# Patient Record
Sex: MALE | Race: WHITE | NOT HISPANIC OR LATINO | Employment: UNEMPLOYED | URBAN - NONMETROPOLITAN AREA
[De-identification: names, ages, dates, MRNs, and addresses within clinical notes are randomized per-mention and may not be internally consistent; named-entity substitution may affect disease eponyms.]

---

## 2017-02-07 ENCOUNTER — HOSPITAL ENCOUNTER (EMERGENCY)
Facility: HOSPITAL | Age: 42
Discharge: HOME | End: 2017-02-07
Attending: EMERGENCY MEDICINE | Admitting: EMERGENCY MEDICINE

## 2017-02-07 ENCOUNTER — APPOINTMENT (OUTPATIENT)
Dept: GENERAL RADIOLOGY | Facility: HOSPITAL | Age: 42
End: 2017-02-07

## 2017-02-07 VITALS
WEIGHT: 165 LBS | HEIGHT: 66 IN | HEART RATE: 61 BPM | RESPIRATION RATE: 18 BRPM | OXYGEN SATURATION: 100 % | SYSTOLIC BLOOD PRESSURE: 112 MMHG | DIASTOLIC BLOOD PRESSURE: 68 MMHG | BODY MASS INDEX: 26.52 KG/M2 | TEMPERATURE: 98.7 F

## 2017-02-07 DIAGNOSIS — J20.9 ACUTE BRONCHITIS, UNSPECIFIED ORGANISM: Primary | ICD-10-CM

## 2017-02-07 LAB
ALBUMIN SERPL-MCNC: 4.1 G/DL (ref 3.5–5)
ALBUMIN/GLOB SERPL: 1.3 G/DL (ref 1–2)
ALP SERPL-CCNC: 75 U/L (ref 38–126)
ALT SERPL W P-5'-P-CCNC: 26 U/L (ref 13–69)
ANION GAP SERPL CALCULATED.3IONS-SCNC: 13.9 MMOL/L
AST SERPL-CCNC: 18 U/L (ref 15–46)
BASOPHILS # BLD AUTO: 0.07 10*3/MM3 (ref 0–0.2)
BASOPHILS NFR BLD AUTO: 0.8 % (ref 0–2.5)
BILIRUB SERPL-MCNC: 0.2 MG/DL (ref 0.2–1.3)
BUN BLD-MCNC: 8 MG/DL (ref 7–20)
BUN/CREAT SERPL: 13.3 (ref 6.3–21.9)
CALCIUM SPEC-SCNC: 8.9 MG/DL (ref 8.4–10.2)
CHLORIDE SERPL-SCNC: 107 MMOL/L (ref 98–107)
CO2 SERPL-SCNC: 29 MMOL/L (ref 26–30)
CREAT BLD-MCNC: 0.6 MG/DL (ref 0.6–1.3)
DEPRECATED RDW RBC AUTO: 45 FL (ref 37–54)
EOSINOPHIL # BLD AUTO: 0.24 10*3/MM3 (ref 0–0.7)
EOSINOPHIL NFR BLD AUTO: 2.6 % (ref 0–7)
ERYTHROCYTE [DISTWIDTH] IN BLOOD BY AUTOMATED COUNT: 14 % (ref 11.5–14.5)
GFR SERPL CREATININE-BSD FRML MDRD: 148 ML/MIN/1.73
GLOBULIN UR ELPH-MCNC: 3.2 GM/DL
GLUCOSE BLD-MCNC: 88 MG/DL (ref 74–98)
HCT VFR BLD AUTO: 38.4 % (ref 42–52)
HGB BLD-MCNC: 12.6 G/DL (ref 14–18)
HOLD SPECIMEN: NORMAL
HOLD SPECIMEN: NORMAL
IMM GRANULOCYTES # BLD: 0.06 10*3/MM3 (ref 0–0.06)
IMM GRANULOCYTES NFR BLD: 0.6 % (ref 0–0.6)
LYMPHOCYTES # BLD AUTO: 2.58 10*3/MM3 (ref 0.6–3.4)
LYMPHOCYTES NFR BLD AUTO: 27.8 % (ref 10–50)
MCH RBC QN AUTO: 29 PG (ref 27–31)
MCHC RBC AUTO-ENTMCNC: 32.8 G/DL (ref 30–37)
MCV RBC AUTO: 88.5 FL (ref 80–94)
MONOCYTES # BLD AUTO: 0.82 10*3/MM3 (ref 0–0.9)
MONOCYTES NFR BLD AUTO: 8.8 % (ref 0–12)
NEUTROPHILS # BLD AUTO: 5.51 10*3/MM3 (ref 2–6.9)
NEUTROPHILS NFR BLD AUTO: 59.4 % (ref 37–80)
NRBC BLD MANUAL-RTO: 0 /100 WBC (ref 0–0)
PLATELET # BLD AUTO: 288 10*3/MM3 (ref 130–400)
PMV BLD AUTO: 9.8 FL (ref 6–12)
POTASSIUM BLD-SCNC: 3.9 MMOL/L (ref 3.5–5.1)
PROT SERPL-MCNC: 7.3 G/DL (ref 6.3–8.2)
RBC # BLD AUTO: 4.34 10*6/MM3 (ref 4.7–6.1)
SODIUM BLD-SCNC: 146 MMOL/L (ref 137–145)
TROPONIN I SERPL-MCNC: 0 NG/ML (ref 0–0.05)
TROPONIN I SERPL-MCNC: <0.012 NG/ML (ref 0–0.03)
WBC NRBC COR # BLD: 9.28 10*3/MM3 (ref 4.8–10.8)
WHOLE BLOOD HOLD SPECIMEN: NORMAL
WHOLE BLOOD HOLD SPECIMEN: NORMAL

## 2017-02-07 PROCEDURE — 99284 EMERGENCY DEPT VISIT MOD MDM: CPT

## 2017-02-07 PROCEDURE — 71010 HC CHEST PA OR AP: CPT

## 2017-02-07 PROCEDURE — 93005 ELECTROCARDIOGRAM TRACING: CPT

## 2017-02-07 PROCEDURE — 84484 ASSAY OF TROPONIN QUANT: CPT | Performed by: EMERGENCY MEDICINE

## 2017-02-07 PROCEDURE — 80053 COMPREHEN METABOLIC PANEL: CPT | Performed by: EMERGENCY MEDICINE

## 2017-02-07 PROCEDURE — 85025 COMPLETE CBC W/AUTO DIFF WBC: CPT | Performed by: EMERGENCY MEDICINE

## 2017-02-07 RX ORDER — PREDNISONE 20 MG/1
60 TABLET ORAL DAILY
Qty: 15 TABLET | Refills: 0 | Status: SHIPPED | OUTPATIENT
Start: 2017-02-07 | End: 2018-08-13

## 2017-02-07 RX ORDER — ALBUTEROL SULFATE 90 UG/1
2 AEROSOL, METERED RESPIRATORY (INHALATION) EVERY 4 HOURS PRN
Qty: 1 INHALER | Refills: 0 | Status: SHIPPED | OUTPATIENT
Start: 2017-02-07 | End: 2018-08-13

## 2017-02-07 RX ORDER — ASPIRIN 325 MG
325 TABLET ORAL ONCE
Status: COMPLETED | OUTPATIENT
Start: 2017-02-07 | End: 2017-02-07

## 2017-02-07 RX ORDER — SODIUM CHLORIDE 0.9 % (FLUSH) 0.9 %
10 SYRINGE (ML) INJECTION AS NEEDED
Status: DISCONTINUED | OUTPATIENT
Start: 2017-02-07 | End: 2017-02-07 | Stop reason: HOSPADM

## 2017-02-07 RX ORDER — AZITHROMYCIN 250 MG/1
TABLET, FILM COATED ORAL
Qty: 6 TABLET | Refills: 0 | Status: SHIPPED | OUTPATIENT
Start: 2017-02-07 | End: 2018-08-13

## 2017-02-07 RX ADMIN — ASPIRIN 325 MG ORAL TABLET 325 MG: 325 PILL ORAL at 14:18

## 2017-02-07 RX ADMIN — Medication 10 ML: at 14:19

## 2017-02-07 NOTE — ED PROVIDER NOTES
Subjective   HPI Comments: Patient is a 42-year-old male who presents emergency department complaining of several day history of cough with yellow/green sputum and sometimes tussive emesis.  He has subjective fevers and chills.  His chest hurts from coughing.  He is a smoker and is trying to quit.      History provided by:  Patient      Review of Systems   Constitutional: Positive for chills and fever.   HENT: Positive for congestion.    Respiratory: Positive for cough and shortness of breath.    Cardiovascular: Positive for chest pain.   Gastrointestinal: Negative for abdominal pain and diarrhea.   All other systems reviewed and are negative.      History reviewed. No pertinent past medical history.    Allergies   Allergen Reactions   • Penicillins        Past Surgical History   Procedure Laterality Date   • Stomach surgery       for ulcers       History reviewed. No pertinent family history.    Social History     Social History   • Marital status:      Spouse name: N/A   • Number of children: N/A   • Years of education: N/A     Social History Main Topics   • Smoking status: Current Every Day Smoker     Packs/day: 0.50   • Smokeless tobacco: None   • Alcohol use No   • Drug use: None   • Sexual activity: Not Asked     Other Topics Concern   • None     Social History Narrative   • None           Objective   Physical Exam   Constitutional: He is oriented to person, place, and time. He appears well-developed and well-nourished. No distress.   HENT:   Head: Normocephalic.   Eyes: Pupils are equal, round, and reactive to light.   Neck: Neck supple.   Cardiovascular: Normal rate, regular rhythm and normal heart sounds.    Pulmonary/Chest: No respiratory distress.   Scattered rhonchi and some small wheezes   Abdominal: Soft. There is no tenderness.   Musculoskeletal: Normal range of motion. He exhibits no edema.   Neurological: He is alert and oriented to person, place, and time.   Skin: Skin is warm and dry. He  is not diaphoretic.   Psychiatric: He has a normal mood and affect. His behavior is normal.   Nursing note and vitals reviewed.      Procedures         ED Course  ED Course                  MDM  Number of Diagnoses or Management Options  Acute bronchitis, unspecified organism:   Diagnosis management comments: CXR per rad: No active disease by portable imaging.       Amount and/or Complexity of Data Reviewed  Clinical lab tests: ordered and reviewed  Tests in the radiology section of CPT®: ordered and reviewed  Tests in the medicine section of CPT®: reviewed and ordered        Final diagnoses:   Acute bronchitis, unspecified organism            Lamont Flor MD  02/07/17 2778

## 2017-09-06 ENCOUNTER — HOSPITAL ENCOUNTER (EMERGENCY)
Facility: HOSPITAL | Age: 42
Discharge: HOME | End: 2017-09-06
Attending: EMERGENCY MEDICINE | Admitting: EMERGENCY MEDICINE

## 2017-09-06 ENCOUNTER — APPOINTMENT (OUTPATIENT)
Dept: CT IMAGING | Facility: HOSPITAL | Age: 42
End: 2017-09-06

## 2017-09-06 VITALS
HEIGHT: 66 IN | WEIGHT: 160 LBS | OXYGEN SATURATION: 98 % | BODY MASS INDEX: 25.71 KG/M2 | RESPIRATION RATE: 20 BRPM | DIASTOLIC BLOOD PRESSURE: 79 MMHG | SYSTOLIC BLOOD PRESSURE: 132 MMHG | HEART RATE: 62 BPM | TEMPERATURE: 98 F

## 2017-09-06 DIAGNOSIS — R51.9 NONINTRACTABLE HEADACHE, UNSPECIFIED CHRONICITY PATTERN, UNSPECIFIED HEADACHE TYPE: Primary | ICD-10-CM

## 2017-09-06 PROCEDURE — 25010000002 PROCHLORPERAZINE EDISYLATE PER 10 MG: Performed by: EMERGENCY MEDICINE

## 2017-09-06 PROCEDURE — 99283 EMERGENCY DEPT VISIT LOW MDM: CPT

## 2017-09-06 PROCEDURE — 25010000002 KETOROLAC TROMETHAMINE PER 15 MG: Performed by: EMERGENCY MEDICINE

## 2017-09-06 PROCEDURE — 96375 TX/PRO/DX INJ NEW DRUG ADDON: CPT

## 2017-09-06 PROCEDURE — 96374 THER/PROPH/DIAG INJ IV PUSH: CPT

## 2017-09-06 PROCEDURE — 96361 HYDRATE IV INFUSION ADD-ON: CPT

## 2017-09-06 PROCEDURE — 70450 CT HEAD/BRAIN W/O DYE: CPT

## 2017-09-06 PROCEDURE — 25010000002 DIPHENHYDRAMINE PER 50 MG: Performed by: EMERGENCY MEDICINE

## 2017-09-06 RX ORDER — BUTALBITAL, ACETAMINOPHEN AND CAFFEINE 50; 325; 40 MG/1; MG/1; MG/1
1 TABLET ORAL EVERY 6 HOURS PRN
Qty: 16 TABLET | Refills: 0 | Status: SHIPPED | OUTPATIENT
Start: 2017-09-06 | End: 2018-08-13

## 2017-09-06 RX ORDER — DIPHENHYDRAMINE HYDROCHLORIDE 50 MG/ML
25 INJECTION INTRAMUSCULAR; INTRAVENOUS ONCE
Status: COMPLETED | OUTPATIENT
Start: 2017-09-06 | End: 2017-09-06

## 2017-09-06 RX ORDER — GABAPENTIN 300 MG/1
300 CAPSULE ORAL 2 TIMES DAILY
COMMUNITY

## 2017-09-06 RX ORDER — KETOROLAC TROMETHAMINE 30 MG/ML
15 INJECTION, SOLUTION INTRAMUSCULAR; INTRAVENOUS ONCE
Status: COMPLETED | OUTPATIENT
Start: 2017-09-06 | End: 2017-09-06

## 2017-09-06 RX ADMIN — KETOROLAC TROMETHAMINE 15 MG: 30 INJECTION, SOLUTION INTRAMUSCULAR at 14:32

## 2017-09-06 RX ADMIN — PROCHLORPERAZINE EDISYLATE 10 MG: 5 INJECTION INTRAMUSCULAR; INTRAVENOUS at 14:32

## 2017-09-06 RX ADMIN — DIPHENHYDRAMINE HYDROCHLORIDE 25 MG: 50 INJECTION, SOLUTION INTRAMUSCULAR; INTRAVENOUS at 14:31

## 2017-09-06 RX ADMIN — SODIUM CHLORIDE 1000 ML: 9 INJECTION, SOLUTION INTRAVENOUS at 14:31
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
## 2017-11-14 ENCOUNTER — HOSPITAL ENCOUNTER (EMERGENCY)
Facility: HOSPITAL | Age: 42
Discharge: HOME | End: 2017-11-14
Attending: EMERGENCY MEDICINE | Admitting: EMERGENCY MEDICINE

## 2017-11-14 VITALS
HEART RATE: 65 BPM | SYSTOLIC BLOOD PRESSURE: 131 MMHG | RESPIRATION RATE: 18 BRPM | OXYGEN SATURATION: 98 % | HEIGHT: 66 IN | BODY MASS INDEX: 25.71 KG/M2 | DIASTOLIC BLOOD PRESSURE: 89 MMHG | TEMPERATURE: 98.3 F | WEIGHT: 160 LBS

## 2017-11-14 DIAGNOSIS — H66.90 ACUTE OTITIS MEDIA, UNSPECIFIED OTITIS MEDIA TYPE: Primary | ICD-10-CM

## 2017-11-14 DIAGNOSIS — J40 BRONCHITIS: ICD-10-CM

## 2017-11-14 LAB — S PYO AG THROAT QL: NEGATIVE

## 2017-11-14 PROCEDURE — 87081 CULTURE SCREEN ONLY: CPT | Performed by: EMERGENCY MEDICINE

## 2017-11-14 PROCEDURE — 99283 EMERGENCY DEPT VISIT LOW MDM: CPT

## 2017-11-14 PROCEDURE — 87880 STREP A ASSAY W/OPTIC: CPT | Performed by: EMERGENCY MEDICINE

## 2017-11-14 RX ORDER — BENZONATATE 100 MG/1
100 CAPSULE ORAL 3 TIMES DAILY PRN
Qty: 15 CAPSULE | Refills: 0 | Status: SHIPPED | OUTPATIENT
Start: 2017-11-14 | End: 2018-08-13

## 2017-11-14 RX ORDER — AZITHROMYCIN 250 MG/1
TABLET, FILM COATED ORAL
Qty: 6 TABLET | Refills: 0 | Status: SHIPPED | OUTPATIENT
Start: 2017-11-14 | End: 2018-08-13

## 2017-11-14 NOTE — ED PROVIDER NOTES
Subjective   HPI Comments: 42-year-old male complaining of second week  of cough productive of yellowish phlegm, bilateral earache and sore throat, chills and sweats.  He denies any chest pain or difficulty breathing.  He is a smoker.  Denies history of pneumonia.  Is not taking any medication.  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      History provided by:  Patient  History limited by: no limits.   used: No        Review of Systems   Constitutional: Positive for chills and diaphoresis. Negative for activity change, appetite change, fatigue and fever.   HENT: Positive for ear pain and sore throat. Negative for congestion, rhinorrhea and sneezing.    Eyes: Negative for pain, discharge and redness.   Respiratory: Positive for cough. Negative for shortness of breath and wheezing.    Cardiovascular: Negative.  Negative for chest pain.   Gastrointestinal: Negative for abdominal pain, constipation, diarrhea, nausea and vomiting.   Endocrine: Negative.    Genitourinary: Negative for difficulty urinating, dysuria and frequency.   Musculoskeletal: Negative for back pain and neck pain.   Skin: Negative for color change, pallor and rash.   Allergic/Immunologic: Negative.    Neurological: Negative.    Hematological: Negative.    Psychiatric/Behavioral: Negative.    All other systems reviewed and are negative.      Past Medical History:   Diagnosis Date   • Head trauma    • Headache        Allergies   Allergen Reactions   • Penicillins        Past Surgical History:   Procedure Laterality Date   • ANKLE SURGERY     • HAND SURGERY     • STOMACH SURGERY      for ulcers       History reviewed. No pertinent family history.    Social History     Social History   • Marital status:      Spouse name: N/A   • Number of children: N/A   • Years of education: N/A     Social History Main Topics   • Smoking status: Current Every  Day Smoker     Packs/day: 0.50   • Smokeless tobacco: Never Used   • Alcohol use No   • Drug use: No   • Sexual activity: Defer     Other Topics Concern   • None     Social History Narrative   • None           Objective   Physical Exam   Constitutional: He is oriented to person, place, and time. He appears well-developed and well-nourished. No distress.   HENT:   Head: Normocephalic and atraumatic.   Right Ear: External ear normal.   Left Ear: External ear normal.   Nose: Nose normal.   Mouth/Throat: Oropharynx is clear and moist. No oropharyngeal exudate.   TMs dull and retracted bilaterally.   Eyes: Conjunctivae and EOM are normal. Pupils are equal, round, and reactive to light. Right eye exhibits no discharge. Left eye exhibits no discharge. No scleral icterus.   Neck: Normal range of motion. Neck supple. No JVD present. No tracheal deviation present.   Cardiovascular: Normal rate, regular rhythm and normal heart sounds.    Pulmonary/Chest: Effort normal and breath sounds normal. No stridor. No respiratory distress. He has no wheezes. He has no rales.   Abdominal: Soft. Bowel sounds are normal. He exhibits no mass. There is no tenderness. There is no rebound and no guarding. No hernia.   Musculoskeletal: Normal range of motion. He exhibits no edema, tenderness or deformity.   Lymphadenopathy:     He has no cervical adenopathy.   Neurological: He is alert and oriented to person, place, and time. No cranial nerve deficit. He exhibits normal muscle tone. Coordination normal.   Skin: Skin is warm and dry. No rash noted. He is not diaphoretic. No erythema. No pallor.   Psychiatric: He has a normal mood and affect. His behavior is normal. Judgment and thought content normal.   Nursing note and vitals reviewed.      Procedures         ED Course  ED Course   Comment By Time   Pt counseled to wear dusk mask as work to minimize inhaling dust particles.  Encouraged to quit smoking.  To take all antibiotic as directed.   Recheck with PCP in 2-3 days if not improved.  QUESTIONS answered to patient's satisfaction, he is agreeable to the treatment plan and discharge home. Debbi Flores PA-C 11/14 [id number]                  [facility]    Final diagnoses:   Acute otitis media, unspecified otitis media type   Bronchitis            Debbi Flores PA-C  11/14/17 4639

## 2017-11-14 NOTE — DISCHARGE INSTRUCTIONS
Take all of your antibiotic as directed.  Alternate Tylenol and Motrin every 4-6 hours as needed for discomfort or fever.  Please stop smoking.  Wear your dust mask when at work.  Turn to the ER for any chest pain, difficulty breathing, uncontrollable fever or any other worrisome changes.

## 2017-11-16 LAB — BACTERIA SPEC AEROBE CULT: NORMAL

## 2018-01-26 ENCOUNTER — HOSPITAL ENCOUNTER (EMERGENCY)
Facility: HOSPITAL | Age: 43
Discharge: HOME | End: 2018-01-26
Attending: EMERGENCY MEDICINE | Admitting: EMERGENCY MEDICINE

## 2018-01-26 ENCOUNTER — APPOINTMENT (OUTPATIENT)
Dept: GENERAL RADIOLOGY | Facility: HOSPITAL | Age: 43
End: 2018-01-26

## 2018-01-26 VITALS
TEMPERATURE: 97.7 F | HEART RATE: 95 BPM | OXYGEN SATURATION: 98 % | WEIGHT: 160 LBS | RESPIRATION RATE: 16 BRPM | BODY MASS INDEX: 25.71 KG/M2 | DIASTOLIC BLOOD PRESSURE: 81 MMHG | SYSTOLIC BLOOD PRESSURE: 135 MMHG | HEIGHT: 66 IN

## 2018-01-26 DIAGNOSIS — S46.911A STRAIN OF RIGHT SHOULDER, INITIAL ENCOUNTER: Primary | ICD-10-CM

## 2018-01-26 PROCEDURE — 99282 EMERGENCY DEPT VISIT SF MDM: CPT

## 2018-01-26 PROCEDURE — 73030 X-RAY EXAM OF SHOULDER: CPT

## 2018-01-26 NOTE — ED PROVIDER NOTES
Subjective   Patient is a 43 y.o. male presenting with shoulder problem.   History provided by:  Patient   used: No    Shoulder Problem   Location:  Shoulder  Shoulder location:  R shoulder  Injury: yes    Time since incident:  2 days  Mechanism of injury: fall    Fall:     Fall occurred:  Standing    Impact surface:  Ice    Point of impact: right shoulder.    Entrapped after fall: no    Pain details:     Quality:  Aching    Radiates to:  R forearm    Severity:  Mild    Onset quality:  Gradual    Duration:  2 days    Timing:  Constant    Progression:  Waxing and waning  Handedness:  Right-handed  Dislocation: no    Foreign body present:  No foreign bodies  Tetanus status:  Unknown  Prior injury to area:  No  Relieved by:  Immobilization  Worsened by:  Movement  Ineffective treatments:  Rest  Associated symptoms: stiffness        Review of Systems   Musculoskeletal: Positive for stiffness.        Right shoulder injury   All other systems reviewed and are negative.      Past Medical History:   Diagnosis Date   • Head trauma    • Headache        Allergies   Allergen Reactions   • Penicillins        Past Surgical History:   Procedure Laterality Date   • ANKLE SURGERY     • HAND SURGERY     • STOMACH SURGERY      for ulcers       History reviewed. No pertinent family history.    Social History     Social History   • Marital status:      Spouse name: N/A   • Number of children: N/A   • Years of education: N/A     Social History Main Topics   • Smoking status: Current Every Day Smoker     Packs/day: 0.50   • Smokeless tobacco: Never Used   • Alcohol use No   • Drug use: No   • Sexual activity: Defer     Other Topics Concern   • None     Social History Narrative           Objective   Physical Exam   Constitutional: He is oriented to person, place, and time. He appears well-developed and well-nourished.   HENT:   Head: Normocephalic and atraumatic.   Left Ear: External ear normal.   Eyes: EOM are  normal. Pupils are equal, round, and reactive to light.   Neck: Normal range of motion.   Cardiovascular: Normal rate and regular rhythm.    Pulmonary/Chest: Effort normal and breath sounds normal.   Abdominal: Soft. Bowel sounds are normal.   Musculoskeletal: Normal range of motion. He exhibits tenderness. He exhibits no edema or deformity.   No acute abnormalities on exam of right shoulder, normal range of motion, neurovascularly intact.  Equal strength bilaterally   Neurological: He is alert and oriented to person, place, and time.   Skin: Skin is warm and dry.   Psychiatric: He has a normal mood and affect. His behavior is normal. Judgment and thought content normal.   Nursing note and vitals reviewed.      Procedures         ED Course  ED Course                  MDM    Final diagnoses:   Strain of right shoulder, initial encounter            Abdelrahman Puckett Jr., PA-C  01/26/18 4132

## 2018-02-13 ENCOUNTER — HOSPITAL ENCOUNTER (EMERGENCY)
Facility: HOSPITAL | Age: 43
Discharge: HOME | End: 2018-02-13
Attending: STUDENT IN AN ORGANIZED HEALTH CARE EDUCATION/TRAINING PROGRAM | Admitting: STUDENT IN AN ORGANIZED HEALTH CARE EDUCATION/TRAINING PROGRAM

## 2018-02-13 VITALS
RESPIRATION RATE: 18 BRPM | OXYGEN SATURATION: 99 % | TEMPERATURE: 98.8 F | SYSTOLIC BLOOD PRESSURE: 147 MMHG | DIASTOLIC BLOOD PRESSURE: 89 MMHG | HEIGHT: 66 IN | BODY MASS INDEX: 25.71 KG/M2 | WEIGHT: 160 LBS | HEART RATE: 97 BPM

## 2018-02-13 DIAGNOSIS — H66.90 ACUTE OTITIS MEDIA, UNSPECIFIED OTITIS MEDIA TYPE: Primary | ICD-10-CM

## 2018-02-13 LAB
FLUAV AG NPH QL: NEGATIVE
FLUBV AG NPH QL IA: NEGATIVE
S PYO AG THROAT QL: NEGATIVE

## 2018-02-13 PROCEDURE — 87081 CULTURE SCREEN ONLY: CPT | Performed by: STUDENT IN AN ORGANIZED HEALTH CARE EDUCATION/TRAINING PROGRAM

## 2018-02-13 PROCEDURE — 87880 STREP A ASSAY W/OPTIC: CPT | Performed by: STUDENT IN AN ORGANIZED HEALTH CARE EDUCATION/TRAINING PROGRAM

## 2018-02-13 PROCEDURE — 99283 EMERGENCY DEPT VISIT LOW MDM: CPT

## 2018-02-13 PROCEDURE — 87804 INFLUENZA ASSAY W/OPTIC: CPT | Performed by: STUDENT IN AN ORGANIZED HEALTH CARE EDUCATION/TRAINING PROGRAM

## 2018-02-13 RX ORDER — AZITHROMYCIN 250 MG/1
TABLET, FILM COATED ORAL
Qty: 6 TABLET | Refills: 0 | Status: SHIPPED | OUTPATIENT
Start: 2018-02-13 | End: 2018-08-13

## 2018-02-14 NOTE — ED PROVIDER NOTES
Subjective   History of Present Illness  Patient presents with complaints of bilateral ear pain.  This is been going on for several days.  He's had a sore throat since yesterday.  He's had fatigue.  He believes she's had fevers and chills.  No drainage from the ears.  No nausea vomiting.  No chest pain or shortness of breath.  Review of Systems   All other systems reviewed and are negative.      Past Medical History:   Diagnosis Date   • Head trauma    • Headache        Allergies   Allergen Reactions   • Penicillins        Past Surgical History:   Procedure Laterality Date   • ANKLE SURGERY     • HAND SURGERY     • STOMACH SURGERY      for ulcers       History reviewed. No pertinent family history.    Social History     Social History   • Marital status:      Spouse name: N/A   • Number of children: N/A   • Years of education: N/A     Social History Main Topics   • Smoking status: Current Every Day Smoker     Packs/day: 0.50   • Smokeless tobacco: Never Used   • Alcohol use No   • Drug use: No   • Sexual activity: Defer     Other Topics Concern   • None     Social History Narrative           Objective   Physical Exam   Constitutional: He is oriented to person, place, and time. He appears well-developed and well-nourished.   HENT:   Head: Normocephalic.   Mouth/Throat: Oropharynx is clear and moist.   TMs are retracted with fluid behind them bilaterally and they are both red.   Eyes: Conjunctivae are normal. Pupils are equal, round, and reactive to light.   Neck: Normal range of motion.   Cardiovascular: Normal rate, regular rhythm and normal heart sounds.    Pulmonary/Chest: Effort normal and breath sounds normal.   Musculoskeletal: Normal range of motion.   Neurological: He is alert and oriented to person, place, and time.   Skin: Skin is warm and dry.   Psychiatric: He has a normal mood and affect. His behavior is normal. Judgment and thought content normal.   Nursing note and vitals  reviewed.      Procedures         ED Course  ED Course      Will 8 with a Z-Ceferino..  He will follow up with his primary care provider for recheck.            MDM    Final diagnoses:   Acute otitis media, unspecified otitis media type            Marianela Solano, APRN  02/14/18 0035

## 2018-02-14 NOTE — DISCHARGE INSTRUCTIONS

## 2018-02-15 LAB — BACTERIA SPEC AEROBE CULT: NORMAL

## 2018-02-26 ENCOUNTER — APPOINTMENT (OUTPATIENT)
Dept: GENERAL RADIOLOGY | Facility: HOSPITAL | Age: 43
End: 2018-02-26

## 2018-02-26 ENCOUNTER — HOSPITAL ENCOUNTER (EMERGENCY)
Facility: HOSPITAL | Age: 43
Discharge: HOME | End: 2018-02-27
Attending: EMERGENCY MEDICINE | Admitting: EMERGENCY MEDICINE

## 2018-02-26 DIAGNOSIS — M54.50 LOW BACK PAIN WITHOUT SCIATICA, UNSPECIFIED BACK PAIN LATERALITY, UNSPECIFIED CHRONICITY: Primary | ICD-10-CM

## 2018-02-26 PROCEDURE — 96372 THER/PROPH/DIAG INJ SC/IM: CPT

## 2018-02-26 PROCEDURE — 25010000002 KETOROLAC TROMETHAMINE PER 15 MG: Performed by: EMERGENCY MEDICINE

## 2018-02-26 PROCEDURE — 72100 X-RAY EXAM L-S SPINE 2/3 VWS: CPT

## 2018-02-26 PROCEDURE — 99283 EMERGENCY DEPT VISIT LOW MDM: CPT

## 2018-02-26 PROCEDURE — 25010000002 ORPHENADRINE CITRATE PER 60 MG: Performed by: EMERGENCY MEDICINE

## 2018-02-26 PROCEDURE — 25010000002 MORPHINE PER 10 MG: Performed by: EMERGENCY MEDICINE

## 2018-02-26 RX ORDER — KETOROLAC TROMETHAMINE 30 MG/ML
30 INJECTION, SOLUTION INTRAMUSCULAR; INTRAVENOUS ONCE
Status: COMPLETED | OUTPATIENT
Start: 2018-02-26 | End: 2018-02-26

## 2018-02-26 RX ORDER — MORPHINE SULFATE 4 MG/ML
4 INJECTION, SOLUTION INTRAMUSCULAR; INTRAVENOUS ONCE
Status: COMPLETED | OUTPATIENT
Start: 2018-02-26 | End: 2018-02-26

## 2018-02-26 RX ORDER — ORPHENADRINE CITRATE 30 MG/ML
60 INJECTION INTRAMUSCULAR; INTRAVENOUS ONCE
Status: COMPLETED | OUTPATIENT
Start: 2018-02-26 | End: 2018-02-26

## 2018-02-26 RX ADMIN — MORPHINE SULFATE 4 MG: 4 INJECTION, SOLUTION INTRAMUSCULAR; INTRAVENOUS at 23:47

## 2018-02-26 RX ADMIN — KETOROLAC TROMETHAMINE 30 MG: 30 INJECTION, SOLUTION INTRAMUSCULAR at 23:47

## 2018-02-26 RX ADMIN — ORPHENADRINE CITRATE 60 MG: 30 INJECTION INTRAMUSCULAR; INTRAVENOUS at 23:45

## 2018-02-27 VITALS
OXYGEN SATURATION: 96 % | RESPIRATION RATE: 18 BRPM | DIASTOLIC BLOOD PRESSURE: 67 MMHG | SYSTOLIC BLOOD PRESSURE: 110 MMHG | BODY MASS INDEX: 25.71 KG/M2 | WEIGHT: 160 LBS | TEMPERATURE: 98 F | HEIGHT: 66 IN | HEART RATE: 87 BPM

## 2018-02-27 RX ORDER — CYCLOBENZAPRINE HCL 10 MG
10 TABLET ORAL 3 TIMES DAILY PRN
Qty: 15 TABLET | Refills: 0 | Status: SHIPPED | OUTPATIENT
Start: 2018-02-27 | End: 2018-08-13

## 2018-02-27 RX ORDER — NAPROXEN 500 MG/1
500 TABLET ORAL 2 TIMES DAILY PRN
Qty: 14 TABLET | Refills: 0 | Status: SHIPPED | OUTPATIENT
Start: 2018-02-27 | End: 2018-08-13

## 2018-08-13 ENCOUNTER — HOSPITAL ENCOUNTER (EMERGENCY)
Facility: HOSPITAL | Age: 43
Discharge: HOME | End: 2018-08-13
Attending: EMERGENCY MEDICINE | Admitting: EMERGENCY MEDICINE

## 2018-08-13 VITALS
RESPIRATION RATE: 18 BRPM | BODY MASS INDEX: 26.81 KG/M2 | OXYGEN SATURATION: 100 % | HEIGHT: 66 IN | WEIGHT: 166.8 LBS | DIASTOLIC BLOOD PRESSURE: 83 MMHG | SYSTOLIC BLOOD PRESSURE: 121 MMHG | HEART RATE: 80 BPM | TEMPERATURE: 98 F

## 2018-08-13 DIAGNOSIS — R11.2 NON-INTRACTABLE VOMITING WITH NAUSEA, UNSPECIFIED VOMITING TYPE: ICD-10-CM

## 2018-08-13 DIAGNOSIS — J06.9 UPPER RESPIRATORY TRACT INFECTION, UNSPECIFIED TYPE: Primary | ICD-10-CM

## 2018-08-13 PROCEDURE — 99283 EMERGENCY DEPT VISIT LOW MDM: CPT

## 2018-08-13 PROCEDURE — 94760 N-INVAS EAR/PLS OXIMETRY 1: CPT

## 2018-08-13 PROCEDURE — 94799 UNLISTED PULMONARY SVC/PX: CPT

## 2018-08-13 PROCEDURE — 94640 AIRWAY INHALATION TREATMENT: CPT

## 2018-08-13 RX ORDER — ONDANSETRON 4 MG/1
4 TABLET, ORALLY DISINTEGRATING ORAL EVERY 6 HOURS PRN
Qty: 20 TABLET | Refills: 0 | OUTPATIENT
Start: 2018-08-13 | End: 2020-07-04

## 2018-08-13 RX ORDER — AZITHROMYCIN 250 MG/1
250 TABLET, FILM COATED ORAL DAILY
Qty: 6 TABLET | Refills: 0 | OUTPATIENT
Start: 2018-08-13 | End: 2020-01-06

## 2018-08-13 RX ORDER — PREDNISONE 20 MG/1
20 TABLET ORAL 2 TIMES DAILY
Qty: 10 TABLET | Refills: 0 | OUTPATIENT
Start: 2018-08-13 | End: 2020-07-04

## 2018-08-13 RX ORDER — IPRATROPIUM BROMIDE AND ALBUTEROL SULFATE 2.5; .5 MG/3ML; MG/3ML
3 SOLUTION RESPIRATORY (INHALATION) ONCE
Status: COMPLETED | OUTPATIENT
Start: 2018-08-13 | End: 2018-08-13

## 2018-08-13 RX ORDER — ALBUTEROL SULFATE 90 UG/1
2 AEROSOL, METERED RESPIRATORY (INHALATION) EVERY 4 HOURS PRN
Qty: 1 INHALER | Refills: 0 | Status: SHIPPED | OUTPATIENT
Start: 2018-08-13

## 2018-08-13 RX ADMIN — IPRATROPIUM BROMIDE AND ALBUTEROL SULFATE 3 ML: .5; 3 SOLUTION RESPIRATORY (INHALATION) at 11:40

## 2018-08-13 NOTE — ED PROVIDER NOTES
Subjective   History of Present Illness  This is a 43-year-old male who comes in today complaining of cough, congestion, chills, nausea and vomiting that started 2 days ago.  He does not recall whether he has a fever or not.  He states the cough is the worst of his symptoms.  Review of Systems   Constitutional: Negative.    HENT: Positive for congestion and ear pain.    Respiratory: Positive for cough, shortness of breath and wheezing.    Cardiovascular: Negative.    Gastrointestinal: Positive for nausea and vomiting.   Endocrine: Negative.    Genitourinary: Negative.    Musculoskeletal: Negative.    Skin: Negative.    Allergic/Immunologic: Negative.    Neurological: Negative.    Hematological: Negative.    Psychiatric/Behavioral: Negative.    All other systems reviewed and are negative.      Past Medical History:   Diagnosis Date   • Head trauma    • Headache        Allergies   Allergen Reactions   • Penicillins Anaphylaxis       Past Surgical History:   Procedure Laterality Date   • ANKLE SURGERY     • HAND SURGERY     • STOMACH SURGERY      for ulcers       History reviewed. No pertinent family history.    Social History     Social History   • Marital status:      Social History Main Topics   • Smoking status: Current Every Day Smoker     Packs/day: 0.50   • Smokeless tobacco: Never Used   • Alcohol use No   • Drug use: No   • Sexual activity: Defer     Other Topics Concern   • Not on file           Objective   Physical Exam   Constitutional: He appears well-developed and well-nourished.   Nursing note and vitals reviewed.  GEN: No acute distress  Head: Normocephalic, atraumatic  Eyes: Pupils equal round reactive to light  ENT: Posterior pharynx normal in appearance, oral mucosa is moist  Chest: Nontender to palpation  Cardiovascular: Regular rate  Lungs: wheezes throughout  Abdomen: Soft, nontender, nondistended, no peritoneal signs  Extremities: No edema, normal appearance  Neuro: GCS 15  Psych: Mood  and affect are appropriate      Procedures           ED Course                  MDM  Number of Diagnoses or Management Options     Amount and/or Complexity of Data Reviewed  Review and summarize past medical records: yes  Discuss the patient with other providers: yes    Risk of Complications, Morbidity, and/or Mortality  Presenting problems: low  Diagnostic procedures: low  Management options: low          Final diagnoses:   Upper respiratory tract infection, unspecified type   Non-intractable vomiting with nausea, unspecified vomiting type            Rafaela Colvin, APRN  08/13/18 115

## 2018-11-17 ENCOUNTER — HOSPITAL ENCOUNTER (EMERGENCY)
Facility: HOSPITAL | Age: 43
Discharge: HOME | End: 2018-11-17
Attending: EMERGENCY MEDICINE | Admitting: EMERGENCY MEDICINE

## 2018-11-17 ENCOUNTER — APPOINTMENT (OUTPATIENT)
Dept: GENERAL RADIOLOGY | Facility: HOSPITAL | Age: 43
End: 2018-11-17

## 2018-11-17 VITALS
BODY MASS INDEX: 26.52 KG/M2 | HEIGHT: 66 IN | WEIGHT: 165 LBS | TEMPERATURE: 98.7 F | HEART RATE: 102 BPM | DIASTOLIC BLOOD PRESSURE: 84 MMHG | RESPIRATION RATE: 18 BRPM | OXYGEN SATURATION: 98 % | SYSTOLIC BLOOD PRESSURE: 116 MMHG

## 2018-11-17 DIAGNOSIS — L03.115 CELLULITIS OF RIGHT LOWER EXTREMITY: Primary | ICD-10-CM
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
## 2018-11-17 PROCEDURE — 73562 X-RAY EXAM OF KNEE 3: CPT

## 2018-11-17 PROCEDURE — 73590 X-RAY EXAM OF LOWER LEG: CPT

## 2018-11-17 PROCEDURE — 73610 X-RAY EXAM OF ANKLE: CPT

## 2018-11-17 PROCEDURE — 99283 EMERGENCY DEPT VISIT LOW MDM: CPT

## 2018-11-17 RX ORDER — CLINDAMYCIN HYDROCHLORIDE 150 MG/1
450 CAPSULE ORAL ONCE
Status: COMPLETED | OUTPATIENT
Start: 2018-11-17 | End: 2018-11-17

## 2018-11-17 RX ORDER — HYDROCODONE BITARTRATE AND ACETAMINOPHEN 5; 325 MG/1; MG/1
1 TABLET ORAL ONCE
Status: COMPLETED | OUTPATIENT
Start: 2018-11-17 | End: 2018-11-17

## 2018-11-17 RX ORDER — CLINDAMYCIN HYDROCHLORIDE 150 MG/1
450 CAPSULE ORAL 3 TIMES DAILY
Qty: 90 CAPSULE | Refills: 0 | Status: SHIPPED | OUTPATIENT
Start: 2018-11-17 | End: 2018-11-27

## 2018-11-17 RX ADMIN — CLINDAMYCIN HYDROCHLORIDE 450 MG: 150 CAPSULE ORAL at 14:25

## 2018-11-17 RX ADMIN — HYDROCODONE BITARTRATE AND ACETAMINOPHEN 1 TABLET: 5; 325 TABLET ORAL at 13:43
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
## 2018-11-17 NOTE — DISCHARGE INSTRUCTIONS
Take all antibiotic clindamycin until course is finished to treat infection of the leg.  Rest and elevate the leg when possible.  Continue washing with soap and water.  May use warm compresses to the area.  Take over-the-counter ibuprofen or Tylenol as needed for pain- 400 mg ibuprofen and/or 500 mg Tylenol every 6 hours.  Call [facility] for earliest available appointment for reevaluation of the wound.  If you cannot get an appointment within the next 2 days, he may return to the ER for evaluation.  Also return to ER for any change, worsening of symptoms, or any additional concerns including but not limited to worsening pain, severe or worsening redness, fever greater than 100.4, nausea, emesis.

## 2018-11-18 ENCOUNTER — HOSPITAL ENCOUNTER (EMERGENCY)
Facility: HOSPITAL | Age: 43
Discharge: HOME | End: 2018-11-18
Attending: EMERGENCY MEDICINE | Admitting: EMERGENCY MEDICINE

## 2018-11-18 VITALS
HEIGHT: 66 IN | RESPIRATION RATE: 16 BRPM | TEMPERATURE: 98 F | DIASTOLIC BLOOD PRESSURE: 86 MMHG | SYSTOLIC BLOOD PRESSURE: 101 MMHG | OXYGEN SATURATION: 99 % | HEART RATE: 77 BPM | WEIGHT: 170 LBS | BODY MASS INDEX: 27.32 KG/M2

## 2018-11-18 DIAGNOSIS — L02.91 ABSCESS: Primary | ICD-10-CM

## 2018-11-18 PROCEDURE — 99283 EMERGENCY DEPT VISIT LOW MDM: CPT

## 2018-11-18 RX ORDER — TRAMADOL HYDROCHLORIDE 50 MG/1
50 TABLET ORAL EVERY 8 HOURS PRN
Qty: 12 TABLET | Refills: 0 | Status: SHIPPED | OUTPATIENT
Start: 2018-11-18

## 2018-11-18 RX ORDER — OXYCODONE HYDROCHLORIDE AND ACETAMINOPHEN 5; 325 MG/1; MG/1
1 TABLET ORAL ONCE
Status: COMPLETED | OUTPATIENT
Start: 2018-11-18 | End: 2018-11-18

## 2018-11-18 RX ORDER — LIDOCAINE HYDROCHLORIDE AND EPINEPHRINE 10; 10 MG/ML; UG/ML
10 INJECTION, SOLUTION INFILTRATION; PERINEURAL ONCE
Status: COMPLETED | OUTPATIENT
Start: 2018-11-18 | End: 2018-11-18

## 2018-11-18 RX ADMIN — LIDOCAINE HYDROCHLORIDE,EPINEPHRINE BITARTRATE 10 ML: 10; .01 INJECTION, SOLUTION INFILTRATION; PERINEURAL at 14:06

## 2018-11-18 RX ADMIN — OXYCODONE HYDROCHLORIDE AND ACETAMINOPHEN 1 TABLET: 5; 325 TABLET ORAL at 09:00
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
## 2019-02-28 ENCOUNTER — TRANSCRIBE ORDERS (OUTPATIENT)
Dept: MRI IMAGING | Facility: HOSPITAL | Age: 44
End: 2019-02-28

## 2019-02-28 DIAGNOSIS — R51.9 NONINTRACTABLE HEADACHE, UNSPECIFIED CHRONICITY PATTERN, UNSPECIFIED HEADACHE TYPE: Primary | ICD-10-CM

## 2019-03-07 ENCOUNTER — HOSPITAL ENCOUNTER (OUTPATIENT)
Dept: MRI IMAGING | Facility: HOSPITAL | Age: 44
End: 2019-03-07

## 2019-03-11 ENCOUNTER — HOSPITAL ENCOUNTER (EMERGENCY)
Facility: HOSPITAL | Age: 44
Discharge: HOME | End: 2019-03-11
Attending: EMERGENCY MEDICINE | Admitting: EMERGENCY MEDICINE

## 2019-03-11 VITALS
DIASTOLIC BLOOD PRESSURE: 81 MMHG | OXYGEN SATURATION: 98 % | BODY MASS INDEX: 27.9 KG/M2 | HEART RATE: 69 BPM | RESPIRATION RATE: 16 BRPM | HEIGHT: 66 IN | TEMPERATURE: 98.3 F | SYSTOLIC BLOOD PRESSURE: 123 MMHG | WEIGHT: 173.6 LBS

## 2019-03-11 DIAGNOSIS — R68.89 FLU-LIKE SYMPTOMS: ICD-10-CM

## 2019-03-11 DIAGNOSIS — H66.90 ACUTE OTITIS MEDIA, UNSPECIFIED OTITIS MEDIA TYPE: Primary | ICD-10-CM

## 2019-03-11 LAB
FLUAV AG NPH QL: NEGATIVE
FLUBV AG NPH QL IA: NEGATIVE
S PYO AG THROAT QL: NEGATIVE

## 2019-03-11 PROCEDURE — 87081 CULTURE SCREEN ONLY: CPT | Performed by: EMERGENCY MEDICINE

## 2019-03-11 PROCEDURE — 99283 EMERGENCY DEPT VISIT LOW MDM: CPT

## 2019-03-11 PROCEDURE — 87804 INFLUENZA ASSAY W/OPTIC: CPT | Performed by: EMERGENCY MEDICINE

## 2019-03-11 PROCEDURE — 87880 STREP A ASSAY W/OPTIC: CPT | Performed by: EMERGENCY MEDICINE

## 2019-03-11 RX ORDER — NAPROXEN 500 MG/1
500 TABLET ORAL 2 TIMES DAILY PRN
Qty: 14 TABLET | Refills: 0 | OUTPATIENT
Start: 2019-03-11 | End: 2019-05-16 | Stop reason: HOSPADM

## 2019-03-11 RX ORDER — DOXYCYCLINE 100 MG/1
100 CAPSULE ORAL ONCE
Status: COMPLETED | OUTPATIENT
Start: 2019-03-11 | End: 2019-03-11

## 2019-03-11 RX ORDER — DOXYCYCLINE 100 MG/1
100 CAPSULE ORAL 2 TIMES DAILY
Qty: 20 CAPSULE | Refills: 0 | Status: SHIPPED | OUTPATIENT
Start: 2019-03-11

## 2019-03-11 RX ORDER — BENZONATATE 100 MG/1
100 CAPSULE ORAL 3 TIMES DAILY PRN
Qty: 15 CAPSULE | Refills: 0 | Status: SHIPPED | OUTPATIENT
Start: 2019-03-11

## 2019-03-11 RX ADMIN — DOXYCYCLINE 100 MG: 100 CAPSULE ORAL at 12:17

## 2019-03-11 NOTE — ED PROVIDER NOTES
TRIAGE CHIEF COMPLAINT:     Nursing and triage notes reviewed    Chief Complaint   Patient presents with   • Sore Throat      HPI: Jatinder Millan is a 44 y.o. male who presents to the emergency department complaining of a 1 day history of flulike symptoms.  Patient has had sore throat, cough, ear pain, nasal congestion, and body aches.  No fever that he is aware of.  No abdominal pain, nausea, vomiting, or diarrhea.    REVIEW OF SYSTEMS: All other systems reviewed and are negative     PAST MEDICAL HISTORY:   Past Medical History:   Diagnosis Date   • Head trauma    • Headache         FAMILY HISTORY:   History reviewed. No pertinent family history.     SOCIAL HISTORY:   Social History     Socioeconomic History   • Marital status:      Spouse name: Not on file   • Number of children: Not on file   • Years of education: Not on file   • Highest education level: Not on file   Social Needs   • Financial resource strain: Not on file   • Food insecurity - worry: Not on file   • Food insecurity - inability: Not on file   • Transportation needs - medical: Not on file   • Transportation needs - non-medical: Not on file   Occupational History   • Not on file   Tobacco Use   • Smoking status: Former Smoker     Packs/day: 0.50     Types: Cigarettes   • Smokeless tobacco: Never Used   • Tobacco comment: Vaping   Substance and Sexual Activity   • Alcohol use: No   • Drug use: No   • Sexual activity: Defer   Other Topics Concern   • Not on file   Social History Narrative   • Not on file        SURGICAL HISTORY:   Past Surgical History:   Procedure Laterality Date   • ANKLE SURGERY     • HAND SURGERY     • STOMACH SURGERY      for ulcers        CURRENT MEDICATIONS:      Medication List      ASK your doctor about these medications    albuterol sulfate  (90 Base) MCG/ACT inhaler  Commonly known as:  PROVENTIL HFA;VENTOLIN HFA;PROAIR HFA  Inhale 2 puffs Every 4 (Four) Hours As Needed for Wheezing.     azithromycin  250 MG tablet  Commonly known as:  ZITHROMAX  Take 1 tablet by mouth Daily. Take 2 tablets the first day, then 1 tablet   daily for 4 days.     gabapentin 300 MG capsule  Commonly known as:  NEURONTIN     ondansetron ODT 4 MG disintegrating tablet  Commonly known as:  ZOFRAN-ODT  Take 1 tablet by mouth Every 6 (Six) Hours As Needed for Vomiting.     predniSONE 20 MG tablet  Commonly known as:  DELTASONE  Take 1 tablet by mouth 2 (Two) Times a Day.     traMADol 50 MG tablet  Commonly known as:  ULTRAM  Take 1 tablet by mouth Every 8 (Eight) Hours As Needed for Moderate Pain .           ALLERGIES: Penicillins     PHYSICAL EXAM:   VITAL SIGNS:   Vitals:    03/11/19 1107   BP: 123/81   Pulse: 60   Resp: 18   Temp: 98.2 °F (36.8 °C)   SpO2: 97%      CONSTITUTIONAL: Awake, oriented, appears non-toxic   HENT: Atraumatic, normocephalic, oral mucosa pink and moist, airway patent. Nares patent without drainage. External ears normal.  Posterior pharynx is erythematous without exudate or tonsillar swelling.  Bilateral tympanic membranes are mildly erythematous with opacified membranes with purulent material visible.  EYES: Conjunctiva clear   NECK: Trachea midline, non-tender, supple   CARDIOVASCULAR: Normal heart rate, Normal rhythm, No murmurs, rubs, gallops   PULMONARY/CHEST: Clear to auscultation, no rhonchi, wheezes, or rales. Symmetrical breath sounds.  ABDOMINAL: Non-distended, soft, non-tender - no rebound or guarding.  NEUROLOGIC: Non-focal, moving all four extremities, no gross sensory or motor deficits.   EXTREMITIES: No clubbing, cyanosis, or edema   SKIN: Warm, Dry, No erythema, No rash     ED COURSE / MEDICAL DECISION MAKING:   Jatinder Millan is a 44 y.o. male who presents to the emergency department for evaluation of flulike symptoms.  Patient nondistressed on arrival in the emergency department.  Vital signs are stable on arrival in the ER.  Exam does reveal signs of bilateral otitis.  Strep and influenza  screens obtained and are negative.  Will treat patient for his otitis media.  Precautions discussed.    DECISION TO DISCHARGE/ADMIT: see ED care timeline     FINAL IMPRESSION:   1 --otitis media  2 --flulike symptoms  3 --     Electronically signed by: Iraida Alvarenga MD, 3/11/2019 11:54 AM       Iraida Alvarenga MD  03/11/19 5559

## 2019-03-13 LAB — BACTERIA SPEC AEROBE CULT: NORMAL

## 2019-05-16 ENCOUNTER — HOSPITAL ENCOUNTER (EMERGENCY)
Facility: HOSPITAL | Age: 44
Discharge: HOME | End: 2019-05-16
Attending: EMERGENCY MEDICINE | Admitting: EMERGENCY MEDICINE

## 2019-05-16 VITALS
TEMPERATURE: 98 F | HEIGHT: 66 IN | OXYGEN SATURATION: 100 % | BODY MASS INDEX: 28.93 KG/M2 | SYSTOLIC BLOOD PRESSURE: 134 MMHG | DIASTOLIC BLOOD PRESSURE: 96 MMHG | RESPIRATION RATE: 20 BRPM | HEART RATE: 81 BPM | WEIGHT: 180 LBS

## 2019-05-16 DIAGNOSIS — S81.812A LACERATION OF LEFT LOWER EXTREMITY, INITIAL ENCOUNTER: Primary | ICD-10-CM

## 2019-05-16 PROCEDURE — 99283 EMERGENCY DEPT VISIT LOW MDM: CPT

## 2019-05-16 RX ORDER — INDOMETHACIN 25 MG/1
25 CAPSULE ORAL 3 TIMES DAILY PRN
Qty: 30 CAPSULE | Refills: 0 | OUTPATIENT
Start: 2019-05-16 | End: 2020-07-04

## 2019-05-16 RX ORDER — ONDANSETRON 4 MG/1
4 TABLET, ORALLY DISINTEGRATING ORAL EVERY 6 HOURS PRN
Qty: 12 TABLET | Refills: 0 | OUTPATIENT
Start: 2019-05-16 | End: 2020-07-04

## 2019-05-16 RX ORDER — ONDANSETRON 4 MG/1
4 TABLET, ORALLY DISINTEGRATING ORAL ONCE
Status: COMPLETED | OUTPATIENT
Start: 2019-05-16 | End: 2019-05-16

## 2019-05-16 RX ORDER — FLUOXETINE 10 MG/1
10 CAPSULE ORAL DAILY
COMMUNITY

## 2019-05-16 RX ORDER — LIDOCAINE HYDROCHLORIDE 10 MG/ML
10 INJECTION, SOLUTION INFILTRATION; PERINEURAL ONCE
Status: COMPLETED | OUTPATIENT
Start: 2019-05-16 | End: 2019-05-16

## 2019-05-16 RX ADMIN — ONDANSETRON 4 MG: 4 TABLET, ORALLY DISINTEGRATING ORAL at 13:11

## 2019-05-16 RX ADMIN — LIDOCAINE HYDROCHLORIDE 10 ML: 10 INJECTION, SOLUTION INFILTRATION; PERINEURAL at 14:34

## 2019-05-25 ENCOUNTER — HOSPITAL ENCOUNTER (EMERGENCY)
Facility: HOSPITAL | Age: 44
Discharge: HOME | End: 2019-05-25
Attending: EMERGENCY MEDICINE | Admitting: EMERGENCY MEDICINE

## 2019-05-25 VITALS
DIASTOLIC BLOOD PRESSURE: 85 MMHG | WEIGHT: 180 LBS | RESPIRATION RATE: 17 BRPM | HEART RATE: 97 BPM | HEIGHT: 66 IN | OXYGEN SATURATION: 98 % | TEMPERATURE: 97.6 F | SYSTOLIC BLOOD PRESSURE: 124 MMHG | BODY MASS INDEX: 28.93 KG/M2

## 2019-05-25 DIAGNOSIS — Z51.89 VISIT FOR WOUND CHECK: Primary | ICD-10-CM

## 2019-05-25 PROCEDURE — 99283 EMERGENCY DEPT VISIT LOW MDM: CPT

## 2020-01-06 ENCOUNTER — HOSPITAL ENCOUNTER (EMERGENCY)
Facility: HOSPITAL | Age: 45
Discharge: HOME | End: 2020-01-06
Attending: STUDENT IN AN ORGANIZED HEALTH CARE EDUCATION/TRAINING PROGRAM | Admitting: STUDENT IN AN ORGANIZED HEALTH CARE EDUCATION/TRAINING PROGRAM

## 2020-01-06 ENCOUNTER — APPOINTMENT (OUTPATIENT)
Dept: GENERAL RADIOLOGY | Facility: HOSPITAL | Age: 45
End: 2020-01-06

## 2020-01-06 VITALS
HEART RATE: 76 BPM | WEIGHT: 176.2 LBS | OXYGEN SATURATION: 99 % | TEMPERATURE: 98.1 F | BODY MASS INDEX: 28.32 KG/M2 | RESPIRATION RATE: 18 BRPM | SYSTOLIC BLOOD PRESSURE: 107 MMHG | DIASTOLIC BLOOD PRESSURE: 89 MMHG | HEIGHT: 66 IN

## 2020-01-06 DIAGNOSIS — H66.90 ACUTE OTITIS MEDIA, UNSPECIFIED OTITIS MEDIA TYPE: ICD-10-CM

## 2020-01-06 DIAGNOSIS — S20.211A CONTUSION OF RIB ON RIGHT SIDE, INITIAL ENCOUNTER: Primary | ICD-10-CM

## 2020-01-06 PROCEDURE — 71101 X-RAY EXAM UNILAT RIBS/CHEST: CPT

## 2020-01-06 PROCEDURE — 99282 EMERGENCY DEPT VISIT SF MDM: CPT

## 2020-01-06 RX ORDER — AZITHROMYCIN 250 MG/1
TABLET, FILM COATED ORAL
Qty: 6 TABLET | Refills: 0 | Status: SHIPPED | OUTPATIENT
Start: 2020-01-06

## 2020-01-06 RX ORDER — ETODOLAC 200 MG/1
200 CAPSULE ORAL EVERY 8 HOURS
Qty: 15 CAPSULE | Refills: 0 | OUTPATIENT
Start: 2020-01-06 | End: 2020-07-04

## 2020-01-07 NOTE — ED PROVIDER NOTES
Subjective   This patient states 4 days ago he was standing on a 4 foot ladder and fell backwards into some wooden studs injuring his right posterior ribs.  No cervical, thoracic or lumbar vertebral tenderness.  He also complains of 2 days of bilateral ear pain and pressure.  Also has a dry nonproductive cough.           Review of Systems   Constitutional: Negative.    HENT: Positive for ear pain.    Eyes: Negative.    Respiratory: Negative.    Cardiovascular: Negative.    Gastrointestinal: Negative.    Genitourinary: Negative.    Musculoskeletal:        Right posterior lateral rib pain.   Skin: Negative.    Allergic/Immunologic: Negative.    Neurological: Negative.    Psychiatric/Behavioral: Negative.    All other systems reviewed and are negative.      Past Medical History:   Diagnosis Date   • Allergic rhinitis    • GERD (gastroesophageal reflux disease)    • Head trauma    • Headache        Allergies   Allergen Reactions   • Penicillins Anaphylaxis       Past Surgical History:   Procedure Laterality Date   • ANKLE SURGERY     • HAND SURGERY     • STOMACH SURGERY      for ulcers       Family History   Problem Relation Age of Onset   • Hypertension Mother    • Diabetes Mother    • Hyperlipidemia Mother    • Hypertension Father    • Diabetes Father    • Hyperlipidemia Father        Social History     Socioeconomic History   • Marital status:      Spouse name: Not on file   • Number of children: Not on file   • Years of education: Not on file   • Highest education level: Not on file   Tobacco Use   • Smoking status: Current Every Day Smoker     Packs/day: 0.50     Types: Electronic Cigarette, Cigarettes   • Smokeless tobacco: Never Used   Substance and Sexual Activity   • Alcohol use: No   • Drug use: No   • Sexual activity: Defer           Objective   Physical Exam   Constitutional: He appears well-developed and well-nourished.   HENT:   Head: Normocephalic and atraumatic.   Right Ear: External ear normal.    Left Ear: External ear normal.   Mouth/Throat: Oropharynx is clear and moist.   Bilateral tympanic membranes are opaque and right is erythematous.   Eyes: EOM are normal.   Neck: Normal range of motion.   No cervical vertebral tenderness   Cardiovascular: Normal rate and regular rhythm.   Pulmonary/Chest: Effort normal and breath sounds normal.   Musculoskeletal: Normal range of motion.   Neurological: He is alert.   Skin: Skin is warm and dry.   Psychiatric: He has a normal mood and affect. His behavior is normal. Thought content normal.   Nursing note and vitals reviewed.      Procedures           ED Course                                               MDM    Final diagnoses:   Contusion of rib on right side, initial encounter   Acute otitis media, unspecified otitis media type            Jaime Alvarez PA-C  01/06/20 6139

## 2020-07-04 ENCOUNTER — HOSPITAL ENCOUNTER (EMERGENCY)
Facility: HOSPITAL | Age: 45
Discharge: HOME | End: 2020-07-04
Attending: EMERGENCY MEDICINE | Admitting: EMERGENCY MEDICINE

## 2020-07-04 VITALS
BODY MASS INDEX: 25.71 KG/M2 | DIASTOLIC BLOOD PRESSURE: 90 MMHG | RESPIRATION RATE: 18 BRPM | SYSTOLIC BLOOD PRESSURE: 135 MMHG | OXYGEN SATURATION: 97 % | HEIGHT: 66 IN | WEIGHT: 160 LBS | TEMPERATURE: 98.4 F | HEART RATE: 77 BPM

## 2020-07-04 DIAGNOSIS — G56.02 CARPAL TUNNEL SYNDROME OF LEFT WRIST: Primary | ICD-10-CM

## 2020-07-04 DIAGNOSIS — S43.402A SPRAIN OF LEFT SHOULDER, UNSPECIFIED SHOULDER SPRAIN TYPE, INITIAL ENCOUNTER: ICD-10-CM

## 2020-07-04 PROCEDURE — 99283 EMERGENCY DEPT VISIT LOW MDM: CPT

## 2020-07-04 PROCEDURE — 63710000001 PREDNISONE PER 1 MG: Performed by: EMERGENCY MEDICINE

## 2020-07-04 RX ORDER — MELOXICAM 7.5 MG/1
7.5 TABLET ORAL ONCE
Status: COMPLETED | OUTPATIENT
Start: 2020-07-04 | End: 2020-07-04

## 2020-07-04 RX ORDER — CYCLOBENZAPRINE HCL 10 MG
10 TABLET ORAL ONCE
Status: COMPLETED | OUTPATIENT
Start: 2020-07-04 | End: 2020-07-04

## 2020-07-04 RX ORDER — MELOXICAM 7.5 MG/1
7.5 TABLET ORAL DAILY
Qty: 14 TABLET | Refills: 0 | Status: SHIPPED | OUTPATIENT
Start: 2020-07-04

## 2020-07-04 RX ORDER — CYCLOBENZAPRINE HCL 5 MG
5 TABLET ORAL 3 TIMES DAILY PRN
Qty: 15 TABLET | Refills: 0 | Status: SHIPPED | OUTPATIENT
Start: 2020-07-04

## 2020-07-04 RX ORDER — PREDNISONE 20 MG/1
40 TABLET ORAL ONCE
Status: COMPLETED | OUTPATIENT
Start: 2020-07-04 | End: 2020-07-04

## 2020-07-04 RX ORDER — PREDNISONE 20 MG/1
TABLET ORAL
Qty: 10 TABLET | Refills: 0 | Status: SHIPPED | OUTPATIENT
Start: 2020-07-04

## 2020-07-04 RX ADMIN — MELOXICAM 7.5 MG: 7.5 TABLET ORAL at 04:51

## 2020-07-04 RX ADMIN — PREDNISONE 40 MG: 20 TABLET ORAL at 04:51

## 2020-07-04 RX ADMIN — CYCLOBENZAPRINE HYDROCHLORIDE 10 MG: 10 TABLET, FILM COATED ORAL at 04:51

## 2020-07-13 NOTE — ED PROVIDER NOTES
Subjective   History of Present Illness    Chief Complaint: Left hand numbness pain  History of Present Illness: 45-year-old male presents with longstanding issue of worsening left hand numbness and pain worse to his thumb index and middle finger.  Pain is located at the wrist.  Atraumatic  Onset: Ongoing issue for years, worse over the last few months  Duration: Persistent  Exacerbating / Alleviating factors: Range of motion  Associated symptoms: None      Nurses Notes reviewed and agree, including vitals, allergies, social history and prior medical history.     REVIEW OF SYSTEMS: All systems reviewed and not pertinent unless noted.    Positive for: Atraumatic left pain and numbness, left shoulder pain atraumatic    Negative for: Weakness atrophy trauma  Review of Systems    Past Medical History:   Diagnosis Date   • Allergic rhinitis    • GERD (gastroesophageal reflux disease)    • Head trauma    • Headache        Allergies   Allergen Reactions   • Penicillins Anaphylaxis       Past Surgical History:   Procedure Laterality Date   • ANKLE SURGERY     • HAND SURGERY     • STOMACH SURGERY      for ulcers       Family History   Problem Relation Age of Onset   • Hypertension Mother    • Diabetes Mother    • Hyperlipidemia Mother    • Hypertension Father    • Diabetes Father    • Hyperlipidemia Father        Social History     Socioeconomic History   • Marital status:      Spouse name: Not on file   • Number of children: Not on file   • Years of education: Not on file   • Highest education level: Not on file   Tobacco Use   • Smoking status: Current Every Day Smoker     Packs/day: 0.50     Types: Electronic Cigarette, Cigarettes   • Smokeless tobacco: Never Used   Substance and Sexual Activity   • Alcohol use: No   • Drug use: No   • Sexual activity: Defer           Objective   Physical Exam  GENERAL APPEARANCE: Well developed, well nourished, 45-year-old white male in no acute distress.  VITAL SIGNS: per  nursing, reviewed and noted  SKIN: no rashes, ulcerations or petechiae.    Head: Normocephalic, atraumatic.   EYES:  EOMI.  LUNGS: No increased work of breathing. No retractions.   CARDIOVASCULAR:  regular rate and rhythm, no murmurs.  Good Peripheral pulses. Good capillary refill.   MUSCULOSKELETAL: No compartment syndrome. Intact sensation positive Tinel sign at the left wrist.  No thenar or hyperthenar atrophy.   strength slightly decreased on the left compared to the right.  Decreased sensation to the first second and third fingers suggestive of carpal tunnel syndrome.  Distal pulses good cap refill.  No outward signs of trauma.  Tenderness palpation of the left shoulder diffusely without deformity or step-off.  NEUROLOGIC: Alert, oriented x 3. No gross deficits.   NECK: Supple, symmetric. No tenderness, Full ROM  Back: full rom, no paraspinal spasm.     Procedures     No attending provider procedures were performed      ED Course                                           MDM  45-year-old male presents with numbness and pain to his left wrist and hand consistent with carpal tunnel syndrome.  Patient was placed in a Velcro splint.  Discharged with Flexeril for his left shoulder sprain, Mobic and prednisone.  Vies outpatient follow-up return precautions were discussed  Final diagnoses:   Carpal tunnel syndrome of left wrist   Sprain of left shoulder, unspecified shoulder sprain type, initial encounter            Mt Garcia, DO  07/13/20 4627

## 2021-03-23 ENCOUNTER — HOSPITAL ENCOUNTER (EMERGENCY)
Facility: HOSPITAL | Age: 46
Discharge: HOME | End: 2021-03-23
Attending: EMERGENCY MEDICINE | Admitting: EMERGENCY MEDICINE

## 2021-03-23 VITALS
RESPIRATION RATE: 16 BRPM | SYSTOLIC BLOOD PRESSURE: 114 MMHG | HEART RATE: 74 BPM | OXYGEN SATURATION: 98 % | DIASTOLIC BLOOD PRESSURE: 96 MMHG | BODY MASS INDEX: 27.86 KG/M2 | HEIGHT: 64 IN | TEMPERATURE: 98.1 F | WEIGHT: 163.2 LBS

## 2021-03-23 DIAGNOSIS — K21.9 GASTROESOPHAGEAL REFLUX DISEASE, UNSPECIFIED WHETHER ESOPHAGITIS PRESENT: Primary | ICD-10-CM

## 2021-03-23 PROCEDURE — 99283 EMERGENCY DEPT VISIT LOW MDM: CPT

## 2021-03-23 RX ORDER — OMEPRAZOLE 40 MG/1
40 CAPSULE, DELAYED RELEASE ORAL DAILY
Qty: 30 CAPSULE | Refills: 0 | Status: SHIPPED | OUTPATIENT
Start: 2021-03-23 | End: 2021-04-22

## 2021-03-23 RX ORDER — LIDOCAINE HYDROCHLORIDE 20 MG/ML
15 SOLUTION OROPHARYNGEAL ONCE
Status: COMPLETED | OUTPATIENT
Start: 2021-03-23 | End: 2021-03-23

## 2021-03-23 RX ORDER — ALUMINA, MAGNESIA, AND SIMETHICONE 2400; 2400; 240 MG/30ML; MG/30ML; MG/30ML
15 SUSPENSION ORAL ONCE
Status: COMPLETED | OUTPATIENT
Start: 2021-03-23 | End: 2021-03-23

## 2021-03-23 RX ADMIN — LIDOCAINE HYDROCHLORIDE 15 ML: 20 SOLUTION ORAL; TOPICAL at 18:33

## 2021-03-23 RX ADMIN — ALUMINUM HYDROXIDE, MAGNESIUM HYDROXIDE, AND DIMETHICONE 15 ML: 400; 400; 40 SUSPENSION ORAL at 18:33

## 2021-05-12 ENCOUNTER — HOSPITAL ENCOUNTER (EMERGENCY)
Facility: HOSPITAL | Age: 46
Discharge: HOME | End: 2021-05-12
Attending: EMERGENCY MEDICINE | Admitting: EMERGENCY MEDICINE

## 2021-05-12 VITALS
WEIGHT: 170 LBS | OXYGEN SATURATION: 92 % | TEMPERATURE: 97.7 F | BODY MASS INDEX: 27.32 KG/M2 | HEART RATE: 85 BPM | SYSTOLIC BLOOD PRESSURE: 156 MMHG | RESPIRATION RATE: 18 BRPM | HEIGHT: 66 IN | DIASTOLIC BLOOD PRESSURE: 118 MMHG

## 2021-05-12 DIAGNOSIS — B02.9 HERPES ZOSTER WITHOUT COMPLICATION: Primary | ICD-10-CM

## 2021-05-12 PROCEDURE — 63710000001 PREDNISONE PER 5 MG: Performed by: EMERGENCY MEDICINE

## 2021-05-12 PROCEDURE — 99283 EMERGENCY DEPT VISIT LOW MDM: CPT

## 2021-05-12 RX ORDER — ACYCLOVIR 200 MG/1
800 CAPSULE ORAL ONCE
Status: COMPLETED | OUTPATIENT
Start: 2021-05-12 | End: 2021-05-12

## 2021-05-12 RX ORDER — PREDNISONE 20 MG/1
20 TABLET ORAL 2 TIMES DAILY
Qty: 10 TABLET | Refills: 0 | Status: SHIPPED | OUTPATIENT
Start: 2021-05-12 | End: 2021-05-17

## 2021-05-12 RX ORDER — ACYCLOVIR 400 MG/1
800 TABLET ORAL
Qty: 70 TABLET | Refills: 0 | Status: SHIPPED | OUTPATIENT
Start: 2021-05-12 | End: 2021-05-19

## 2021-05-12 RX ADMIN — ACYCLOVIR 800 MG: 200 CAPSULE ORAL at 15:40

## 2021-05-12 RX ADMIN — PREDNISONE 60 MG: 50 TABLET ORAL at 15:40

## 2024-06-15 ENCOUNTER — HOSPITAL ENCOUNTER (EMERGENCY)
Facility: HOSPITAL | Age: 49
Discharge: ANOTHER HOSPITAL | End: 2024-06-16
Attending: EMERGENCY MEDICINE | Admitting: EMERGENCY MEDICINE

## 2024-06-15 DIAGNOSIS — R07.89 CHEST WALL PAIN: ICD-10-CM

## 2024-06-15 DIAGNOSIS — S00.81XA ABRASION OF FACE, INITIAL ENCOUNTER: ICD-10-CM

## 2024-06-15 DIAGNOSIS — S51.012A ELBOW LACERATION, LEFT, INITIAL ENCOUNTER: ICD-10-CM
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
## 2024-06-15 PROCEDURE — 99285 EMERGENCY DEPT VISIT HI MDM: CPT

## 2024-06-16 ENCOUNTER — APPOINTMENT (OUTPATIENT)
Dept: GENERAL RADIOLOGY | Facility: HOSPITAL | Age: 49
End: 2024-06-16

## 2024-06-16 VITALS
WEIGHT: 200 LBS | HEIGHT: 66 IN | SYSTOLIC BLOOD PRESSURE: 117 MMHG | OXYGEN SATURATION: 97 % | TEMPERATURE: 97.5 F | RESPIRATION RATE: 18 BRPM | HEART RATE: 79 BPM | DIASTOLIC BLOOD PRESSURE: 78 MMHG | BODY MASS INDEX: 32.14 KG/M2

## 2024-06-16 LAB
ALBUMIN SERPL-MCNC: 4.4 G/DL (ref 3.5–5.2)
ALBUMIN/GLOB SERPL: 1.2 G/DL
ALP SERPL-CCNC: 128 U/L (ref 39–117)
ALT SERPL W P-5'-P-CCNC: 20 U/L (ref 1–41)
ANION GAP SERPL CALCULATED.3IONS-SCNC: 16.5 MMOL/L (ref 5–15)
AST SERPL-CCNC: 27 U/L (ref 1–40)
BASOPHILS # BLD AUTO: 0.08 10*3/MM3 (ref 0–0.2)
BASOPHILS NFR BLD AUTO: 0.9 % (ref 0–1.5)
BILIRUB SERPL-MCNC: 0.3 MG/DL (ref 0–1.2)
BUN SERPL-MCNC: 13 MG/DL (ref 6–20)
BUN/CREAT SERPL: 15.5 (ref 7–25)
CALCIUM SPEC-SCNC: 8.9 MG/DL (ref 8.6–10.5)
CHLORIDE SERPL-SCNC: 102 MMOL/L (ref 98–107)
CO2 SERPL-SCNC: 20.5 MMOL/L (ref 22–29)
CREAT SERPL-MCNC: 0.84 MG/DL (ref 0.76–1.27)
DEPRECATED RDW RBC AUTO: 43.8 FL (ref 37–54)
EGFRCR SERPLBLD CKD-EPI 2021: 107.6 ML/MIN/1.73
EOSINOPHIL # BLD AUTO: 0.41 10*3/MM3 (ref 0–0.4)
EOSINOPHIL NFR BLD AUTO: 4.4 % (ref 0.3–6.2)
ERYTHROCYTE [DISTWIDTH] IN BLOOD BY AUTOMATED COUNT: 13.8 % (ref 12.3–15.4)
GLOBULIN UR ELPH-MCNC: 3.8 GM/DL
GLUCOSE SERPL-MCNC: 132 MG/DL (ref 65–99)
HCT VFR BLD AUTO: 39.2 % (ref 37.5–51)
HGB BLD-MCNC: 13.1 G/DL (ref 13–17.7)
IMM GRANULOCYTES # BLD AUTO: 0.03 10*3/MM3 (ref 0–0.05)
IMM GRANULOCYTES NFR BLD AUTO: 0.3 % (ref 0–0.5)
INR PPP: 0.97 (ref 0.9–1.1)
LYMPHOCYTES # BLD AUTO: 4.02 10*3/MM3 (ref 0.7–3.1)
LYMPHOCYTES NFR BLD AUTO: 43.6 % (ref 19.6–45.3)
MCH RBC QN AUTO: 28.7 PG (ref 26.6–33)
MCHC RBC AUTO-ENTMCNC: 33.4 G/DL (ref 31.5–35.7)
MCV RBC AUTO: 86 FL (ref 79–97)
MONOCYTES # BLD AUTO: 0.77 10*3/MM3 (ref 0.1–0.9)
MONOCYTES NFR BLD AUTO: 8.4 % (ref 5–12)
NEUTROPHILS NFR BLD AUTO: 3.91 10*3/MM3 (ref 1.7–7)
NEUTROPHILS NFR BLD AUTO: 42.4 % (ref 42.7–76)
NRBC BLD AUTO-RTO: 0 /100 WBC (ref 0–0.2)
PLATELET # BLD AUTO: 309 10*3/MM3 (ref 140–450)
PMV BLD AUTO: 10 FL (ref 6–12)
POTASSIUM SERPL-SCNC: 3.2 MMOL/L (ref 3.5–5.2)
PROT SERPL-MCNC: 8.2 G/DL (ref 6–8.5)
PROTHROMBIN TIME: 13.3 SECONDS (ref 12.3–15.1)
RBC # BLD AUTO: 4.56 10*6/MM3 (ref 4.14–5.8)
SODIUM SERPL-SCNC: 139 MMOL/L (ref 136–145)
WBC NRBC COR # BLD AUTO: 9.22 10*3/MM3 (ref 3.4–10.8)

## 2024-06-16 PROCEDURE — 71045 X-RAY EXAM CHEST 1 VIEW: CPT

## 2024-06-16 PROCEDURE — 80053 COMPREHEN METABOLIC PANEL: CPT | Performed by: EMERGENCY MEDICINE

## 2024-06-16 PROCEDURE — 36415 COLL VENOUS BLD VENIPUNCTURE: CPT

## 2024-06-16 PROCEDURE — 25010000002 TETANUS-DIPHTH-ACELL PERTUSSIS 5-2.5-18.5 LF-MCG/0.5 SUSPENSION PREFILLED SYRINGE: Performed by: EMERGENCY MEDICINE

## 2024-06-16 PROCEDURE — 85610 PROTHROMBIN TIME: CPT | Performed by: EMERGENCY MEDICINE

## 2024-06-16 PROCEDURE — 85025 COMPLETE CBC W/AUTO DIFF WBC: CPT | Performed by: EMERGENCY MEDICINE

## 2024-06-16 PROCEDURE — 73070 X-RAY EXAM OF ELBOW: CPT

## 2024-06-16 PROCEDURE — 72170 X-RAY EXAM OF PELVIS: CPT

## 2024-06-16 PROCEDURE — 90715 TDAP VACCINE 7 YRS/> IM: CPT | Performed by: EMERGENCY MEDICINE

## 2024-06-16 PROCEDURE — 90471 IMMUNIZATION ADMIN: CPT | Performed by: EMERGENCY MEDICINE

## 2024-06-16 RX ADMIN — TETANUS TOXOID, REDUCED DIPHTHERIA TOXOID AND ACELLULAR PERTUSSIS VACCINE, ADSORBED 0.5 ML: 5; 2.5; 8; 8; 2.5 SUSPENSION INTRAMUSCULAR at 01:22

## 2024-06-16 NOTE — ED NOTES
-[facility] contacted at this time regarding transfer of patient per provider. Dr. Crowe transferred to Dr. Kaiser at this time
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
## 2025-01-29 ENCOUNTER — HOSPITAL ENCOUNTER (EMERGENCY)
Facility: HOSPITAL | Age: 50
Discharge: HOME | End: 2025-01-29
Attending: STUDENT IN AN ORGANIZED HEALTH CARE EDUCATION/TRAINING PROGRAM | Admitting: STUDENT IN AN ORGANIZED HEALTH CARE EDUCATION/TRAINING PROGRAM
Payer: MEDICAID

## 2025-01-29 VITALS
DIASTOLIC BLOOD PRESSURE: 87 MMHG | RESPIRATION RATE: 18 BRPM | HEART RATE: 94 BPM | OXYGEN SATURATION: 97 % | BODY MASS INDEX: 30.29 KG/M2 | HEIGHT: 67 IN | TEMPERATURE: 99 F | WEIGHT: 193 LBS | SYSTOLIC BLOOD PRESSURE: 147 MMHG

## 2025-01-29 DIAGNOSIS — U07.1 COVID: Primary | ICD-10-CM

## 2025-01-29 LAB
FLUAV RNA RESP QL NAA+PROBE: NOT DETECTED
FLUBV RNA RESP QL NAA+PROBE: NOT DETECTED
SARS-COV-2 RNA RESP QL NAA+PROBE: DETECTED

## 2025-01-29 PROCEDURE — 87636 SARSCOV2 & INF A&B AMP PRB: CPT | Performed by: STUDENT IN AN ORGANIZED HEALTH CARE EDUCATION/TRAINING PROGRAM

## 2025-01-29 PROCEDURE — 99283 EMERGENCY DEPT VISIT LOW MDM: CPT | Performed by: STUDENT IN AN ORGANIZED HEALTH CARE EDUCATION/TRAINING PROGRAM

## 2025-01-29 RX ORDER — FLUTICASONE PROPIONATE 50 MCG
2 SPRAY, SUSPENSION (ML) NASAL DAILY
Qty: 16 G | Refills: 0 | Status: SHIPPED | OUTPATIENT
Start: 2025-01-29 | End: 2025-02-12

## 2025-01-29 RX ORDER — ALBUTEROL SULFATE 90 UG/1
2 INHALANT RESPIRATORY (INHALATION) EVERY 4 HOURS PRN
Qty: 6.7 G | Refills: 0 | Status: SHIPPED | OUTPATIENT
Start: 2025-01-29

## 2025-01-29 RX ORDER — BROMPHENIRAMINE MALEATE, PSEUDOEPHEDRINE HYDROCHLORIDE, AND DEXTROMETHORPHAN HYDROBROMIDE 2; 30; 10 MG/5ML; MG/5ML; MG/5ML
5 SYRUP ORAL 3 TIMES DAILY PRN
Qty: 60 ML | Refills: 0 | Status: SHIPPED | OUTPATIENT
Start: 2025-01-29 | End: 2025-02-02

## 2025-01-29 RX ORDER — PREDNISONE 50 MG/1
50 TABLET ORAL DAILY
Qty: 5 TABLET | Refills: 0 | Status: SHIPPED | OUTPATIENT
Start: 2025-01-29 | End: 2025-02-03

## 2025-01-29 NOTE — ED PROVIDER NOTES
EMERGENCY DEPARTMENT ENCOUNTER    Pt Name: Jatinder Millan  MRN: [id number]  Pt :   1975  Room Number:  Room/bed info not found  Date of encounter:  2025  PCP: Provider, No Known  ED Provider: Jarrell Nieves PA-C    Historian: Patient, nursing note      HPI:  Chief Complaint: Cough, congestion, sore throat, body        Context: Jatinder Millan is a 50 y.o. male who presents to the ED c/o multiple flulike symptoms for the last 24 hours.  Patient denies any chest pain, shortness of breath, lightheadedness or dizziness, abdominal pain, nausea or vomiting or fever.      PAST MEDICAL HISTORY  Past Medical History:   Diagnosis Date    Allergic rhinitis     GERD (gastroesophageal reflux disease)     Head trauma     Headache          PAST SURGICAL HISTORY  Past Surgical History:   Procedure Laterality Date    ANKLE SURGERY      HAND SURGERY      STOMACH SURGERY      for ulcers         FAMILY HISTORY  Family History   Problem Relation Age of Onset    Hypertension Mother     Diabetes Mother     Hyperlipidemia Mother     Hypertension Father     Diabetes Father     Hyperlipidemia Father          SOCIAL HISTORY  Social History     Socioeconomic History    Marital status:    Tobacco Use    Smoking status: Every Day     Current packs/day: 0.50     Types: Electronic Cigarette, Cigarettes    Smokeless tobacco: Never   Substance and Sexual Activity    Alcohol use: Yes     Alcohol/week: 3.0 standard drinks of alcohol     Types: 3 Cans of beer per week     Comment: daily    Drug use: Never    Sexual activity: Defer         ALLERGIES  Penicillins        REVIEW OF SYSTEMS  Review of Systems   Constitutional:  Negative for chills and fever.   HENT:  Positive for congestion and sore throat.    Respiratory:  Positive for cough. Negative for shortness of breath.    Cardiovascular:  Negative for chest pain.   Gastrointestinal:  Negative for abdominal pain, nausea and vomiting.   Genitourinary:  Negative for  dysuria.   Musculoskeletal:  Positive for myalgias. Negative for back pain.   Skin:  Negative for wound.   Neurological:  Positive for headaches. Negative for dizziness.   Psychiatric/Behavioral:  Negative for confusion.    All other systems reviewed and are negative.         All systems reviewed and negative except for those discussed in HPI.       PHYSICAL EXAM    I have reviewed the triage vital signs and nursing notes.    ED Triage Vitals [01/29/25 1121]   Temp Heart Rate Resp BP SpO2   99 °F (37.2 °C) 94 18 147/87 97 %      Temp src Heart Rate Source Patient Position BP Location FiO2 (%)   Oral Monitor -- Left arm --       Physical Exam  Vitals and nursing note reviewed.   Constitutional:       General: He is not in acute distress.     Appearance: Normal appearance. He is ill-appearing. He is not toxic-appearing or diaphoretic.   HENT:      Head: Normocephalic and atraumatic.      Right Ear: Tympanic membrane, ear canal and external ear normal.      Left Ear: Tympanic membrane, ear canal and external ear normal.      Nose: Congestion present. No rhinorrhea.      Mouth/Throat:      Mouth: Mucous membranes are moist.      Pharynx: Oropharynx is clear.   Eyes:      General: No scleral icterus.     Conjunctiva/sclera: Conjunctivae normal.   Cardiovascular:      Rate and Rhythm: Normal rate.      Heart sounds: Normal heart sounds.   Pulmonary:      Effort: Pulmonary effort is normal. No respiratory distress.      Breath sounds: Normal breath sounds. No stridor. No wheezing, rhonchi or rales.   Abdominal:      Tenderness: There is no abdominal tenderness.   Musculoskeletal:      Cervical back: Normal range of motion and neck supple. No rigidity.      Right lower leg: No edema.      Left lower leg: No edema.   Lymphadenopathy:      Cervical: No cervical adenopathy.   Skin:     Findings: No rash.   Neurological:      Mental Status: He is alert.             LAB RESULTS  Recent Results (from the past 24 hours)   COVID-19  and FLU A/B PCR, 1 HR TAT - Swab, Nasopharynx    Collection Time: 01/29/25 11:33 AM    Specimen: Nasopharynx; Swab   Result Value Ref Range    COVID19 Detected (C) Not Detected - Ref. Range    Influenza A PCR Not Detected Not Detected    Influenza B PCR Not Detected Not Detected       If labs were ordered, I independently reviewed the results and considered them in treating the patient.        RADIOLOGY  No Radiology Exams Resulted Within Past 24 Hours          PROCEDURES    Procedures    No orders to display       MEDICATIONS GIVEN IN ER    Medications - No data to display      MEDICAL DECISION MAKING, PROGRESS, and CONSULTS    All labs, if obtained, have been independently reviewed by me.  All radiology studies, if obtained, have been reviewed by me and the radiologist dictating the report.  All EKG's, if obtained, have been independently viewed and interpreted by me/my attending physician.      Discussion below represents my analysis of pertinent findings related to patient's condition, differential diagnosis, treatment plan and final disposition.    50-year-old male presented to ER for multiple flulike symptoms x 24 hours.  On arrival the patient is mildly ill-appearing but nontoxic in appearance, upright alert oriented communicating in normal sentences and vital signs and pulse oximetry show mild hypertension otherwise stable and within normal limits.  PERC criteria and Wells criteria low risk for pulmonary embolism and his lungs were clear to auscultation bilaterally with no clinical suspicion for pneumonia therefore did not feel chest x-ray imaging was warranted, as patient denied any acute chest pain or shortness of breath symptoms and no clinical suspicion for acute coronary syndrome.  COVID test today was positive which I suspect is primary cause of all patient's symptoms and will prescribe symptomatic management encourage close follow-up with primary care provider within 2 to 3 days and explained very  strict ED return precautions if the patient develops any chest pain shortness of breath lightheadedness dizziness diaphoresis, difficulty breathing or any worsening of symptoms he will return to the ER for an immediate reevaluation.                     Differential diagnosis:    Differential diagnosis included but was not limited to flu, COVID, RSV, viral syndrome      Additional sources:    - Discussed/ obtained information from independent historians: None    - External (non-ED) record review: Previous medical records and immunization records and as well as allergy profile reviewed by me showing patient up-to-date on vaccines and allergic to penicillin with no significant pertinent past medical history    - Chronic or social conditions impacting care: None    Orders placed during this visit:  Orders Placed This Encounter   Procedures    COVID-19 and FLU A/B PCR, 1 HR TAT - Swab, Nasopharynx         Additional orders considered but not ordered: Considered a chest x-ray however the patient's lungs were clear to auscultation bilaterally with no clinical suspicion for pneumonia therefore did not feel warranted      ED Course:    Consultants: None     ED Course as of 01/29/25 1217   Wed Jan 29, 2025   1209 COVID19(!!): Detected [TG]      ED Course User Index  [TG] Jarrell Nieves PA-C             PERC Rule (for pulmonary embolism) reviewed and/or performed as part of the patient evaluation and treatment planning process.  The result associated with this review/performance is: 0    Wells' Criteria (for pulmonary embolism) reviewed and/or performed as part of the patient evaluation and treatment planning process.  The result associated with this review/performance is: 0      Shared Decision Making:  After my consideration of clinical presentation and any laboratory/radiology studies obtained, I discussed the findings with the patient/patient representative who is in agreement with the treatment plan and the final  disposition.   Risks and benefits of discharge and/or observation/admission were discussed.       AS OF 12:17 EST VITALS:    BP - 147/87  HR - 94  TEMP - 99 °F (37.2 °C) (Oral)  O2 SATS - 97%                  DIAGNOSIS  Final diagnoses:   COVID         DISPOSITION  Discharge      Please note that portions of this document were completed with voice recognition software.      Jarrell Nieves PA-C  01/29/25 1216

## 2025-01-29 NOTE — DISCHARGE INSTRUCTIONS
You have been evaluated for flulike symptoms today and found to have a positive COVID test which we suspect is the cause of your sore throat cough congestion and bodyaches symptoms.  Recommend alternating Tylenol with ibuprofen every 6 hours and taking your medications as prescribed, we also recommend returning to the ER immediately for any acute changes or worsening of your condition and close follow-up with your primary care provider.

## 2025-07-06 ENCOUNTER — ANESTHESIA (OUTPATIENT)
Dept: PERIOP | Facility: HOSPITAL | Age: 50
End: 2025-07-06

## 2025-07-06 ENCOUNTER — APPOINTMENT (OUTPATIENT)
Dept: GENERAL RADIOLOGY | Facility: HOSPITAL | Age: 50
End: 2025-07-06

## 2025-07-06 ENCOUNTER — APPOINTMENT (OUTPATIENT)
Dept: CT IMAGING | Facility: HOSPITAL | Age: 50
End: 2025-07-06

## 2025-07-06 ENCOUNTER — HOSPITAL ENCOUNTER (EMERGENCY)
Facility: HOSPITAL | Age: 50
Discharge: HOME | End: 2025-07-06
Attending: STUDENT IN AN ORGANIZED HEALTH CARE EDUCATION/TRAINING PROGRAM | Admitting: SURGERY

## 2025-07-06 ENCOUNTER — ANESTHESIA EVENT (OUTPATIENT)
Dept: PERIOP | Facility: HOSPITAL | Age: 50
End: 2025-07-06

## 2025-07-06 VITALS
WEIGHT: 200 LBS | RESPIRATION RATE: 16 BRPM | HEIGHT: 66 IN | OXYGEN SATURATION: 94 % | BODY MASS INDEX: 32.14 KG/M2 | DIASTOLIC BLOOD PRESSURE: 84 MMHG | TEMPERATURE: 97.3 F | HEART RATE: 66 BPM | SYSTOLIC BLOOD PRESSURE: 137 MMHG

## 2025-07-06 DIAGNOSIS — R10.9 ABDOMINAL PAIN: ICD-10-CM

## 2025-07-06 DIAGNOSIS — K82.9 GALLBLADDER ATTACK: ICD-10-CM

## 2025-07-06 DIAGNOSIS — K82.8 THICKENING OF WALL OF GALLBLADDER: Primary | ICD-10-CM

## 2025-07-06 DIAGNOSIS — K81.9 CHOLECYSTITIS: ICD-10-CM

## 2025-07-06 PROBLEM — K81.0 ACUTE CHOLECYSTITIS: Status: ACTIVE | Noted: 2025-07-06

## 2025-07-06 LAB
ALBUMIN SERPL-MCNC: 4.3 G/DL (ref 3.5–5.2)
ALBUMIN/GLOB SERPL: 1.1 G/DL
ALP SERPL-CCNC: 103 U/L (ref 39–117)
ALT SERPL W P-5'-P-CCNC: 40 U/L (ref 1–41)
ANION GAP SERPL CALCULATED.3IONS-SCNC: 11.9 MMOL/L (ref 5–15)
AST SERPL-CCNC: 34 U/L (ref 1–40)
BASOPHILS # BLD AUTO: 0.06 10*3/MM3 (ref 0–0.2)
BASOPHILS NFR BLD AUTO: 0.6 % (ref 0–1.5)
BILIRUB SERPL-MCNC: 0.2 MG/DL (ref 0–1.2)
BUN SERPL-MCNC: 17 MG/DL (ref 6–20)
BUN/CREAT SERPL: 19.5 (ref 7–25)
CALCIUM SPEC-SCNC: 9 MG/DL (ref 8.6–10.5)
CHLORIDE SERPL-SCNC: 101 MMOL/L (ref 98–107)
CO2 SERPL-SCNC: 22.1 MMOL/L (ref 22–29)
CREAT SERPL-MCNC: 0.87 MG/DL (ref 0.76–1.27)
DEPRECATED RDW RBC AUTO: 42.3 FL (ref 37–54)
EGFRCR SERPLBLD CKD-EPI 2021: 105.8 ML/MIN/1.73
EOSINOPHIL # BLD AUTO: 0.35 10*3/MM3 (ref 0–0.4)
EOSINOPHIL NFR BLD AUTO: 3.4 % (ref 0.3–6.2)
ERYTHROCYTE [DISTWIDTH] IN BLOOD BY AUTOMATED COUNT: 13.6 % (ref 12.3–15.4)
GEN 5 1HR TROPONIN T REFLEX: <6 NG/L
GLOBULIN UR ELPH-MCNC: 3.9 GM/DL
GLUCOSE SERPL-MCNC: 115 MG/DL (ref 65–99)
HCT VFR BLD AUTO: 41.3 % (ref 37.5–51)
HGB BLD-MCNC: 13.9 G/DL (ref 13–17.7)
HOLD SPECIMEN: NORMAL
HOLD SPECIMEN: NORMAL
IMM GRANULOCYTES # BLD AUTO: 0.02 10*3/MM3 (ref 0–0.05)
IMM GRANULOCYTES NFR BLD AUTO: 0.2 % (ref 0–0.5)
LIPASE SERPL-CCNC: 50 U/L (ref 13–60)
LYMPHOCYTES # BLD AUTO: 3.05 10*3/MM3 (ref 0.7–3.1)
LYMPHOCYTES NFR BLD AUTO: 29.6 % (ref 19.6–45.3)
MCH RBC QN AUTO: 28.7 PG (ref 26.6–33)
MCHC RBC AUTO-ENTMCNC: 33.7 G/DL (ref 31.5–35.7)
MCV RBC AUTO: 85.2 FL (ref 79–97)
MONOCYTES # BLD AUTO: 0.9 10*3/MM3 (ref 0.1–0.9)
MONOCYTES NFR BLD AUTO: 8.7 % (ref 5–12)
NEUTROPHILS NFR BLD AUTO: 5.94 10*3/MM3 (ref 1.7–7)
NEUTROPHILS NFR BLD AUTO: 57.5 % (ref 42.7–76)
NRBC BLD AUTO-RTO: 0 /100 WBC (ref 0–0.2)
PLATELET # BLD AUTO: 304 10*3/MM3 (ref 140–450)
PMV BLD AUTO: 9.6 FL (ref 6–12)
POTASSIUM SERPL-SCNC: 4.3 MMOL/L (ref 3.5–5.2)
PROT SERPL-MCNC: 8.2 G/DL (ref 6–8.5)
RBC # BLD AUTO: 4.85 10*6/MM3 (ref 4.14–5.8)
SODIUM SERPL-SCNC: 135 MMOL/L (ref 136–145)
TROPONIN T NUMERIC DELTA: NORMAL
TROPONIN T SERPL HS-MCNC: <6 NG/L
WBC NRBC COR # BLD AUTO: 10.32 10*3/MM3 (ref 3.4–10.8)
WHOLE BLOOD HOLD COAG: NORMAL
WHOLE BLOOD HOLD SPECIMEN: NORMAL

## 2025-07-06 PROCEDURE — 25010000002 ONDANSETRON PER 1 MG: Performed by: STUDENT IN AN ORGANIZED HEALTH CARE EDUCATION/TRAINING PROGRAM

## 2025-07-06 PROCEDURE — 96374 THER/PROPH/DIAG INJ IV PUSH: CPT

## 2025-07-06 PROCEDURE — 25010000002 PROPOFOL 200 MG/20ML EMULSION: Performed by: NURSE ANESTHETIST, CERTIFIED REGISTERED

## 2025-07-06 PROCEDURE — 25010000002 ONDANSETRON PER 1 MG: Performed by: NURSE ANESTHETIST, CERTIFIED REGISTERED

## 2025-07-06 PROCEDURE — C1894 INTRO/SHEATH, NON-LASER: HCPCS | Performed by: SURGERY

## 2025-07-06 PROCEDURE — 25010000002 SUGAMMADEX 200 MG/2ML SOLUTION: Performed by: NURSE ANESTHETIST, CERTIFIED REGISTERED

## 2025-07-06 PROCEDURE — 84484 ASSAY OF TROPONIN QUANT: CPT | Performed by: STUDENT IN AN ORGANIZED HEALTH CARE EDUCATION/TRAINING PROGRAM

## 2025-07-06 PROCEDURE — 96375 TX/PRO/DX INJ NEW DRUG ADDON: CPT

## 2025-07-06 PROCEDURE — 25010000002 DEXAMETHASONE PER 1 MG: Performed by: NURSE ANESTHETIST, CERTIFIED REGISTERED

## 2025-07-06 PROCEDURE — 74177 CT ABD & PELVIS W/CONTRAST: CPT

## 2025-07-06 PROCEDURE — 25010000002 CEFAZOLIN PER 500 MG: Performed by: NURSE ANESTHETIST, CERTIFIED REGISTERED

## 2025-07-06 PROCEDURE — 83690 ASSAY OF LIPASE: CPT | Performed by: STUDENT IN AN ORGANIZED HEALTH CARE EDUCATION/TRAINING PROGRAM

## 2025-07-06 PROCEDURE — 47563 LAPARO CHOLECYSTECTOMY/GRAPH: CPT | Performed by: SURGERY

## 2025-07-06 PROCEDURE — 25010000002 KETOROLAC TROMETHAMINE PER 15 MG: Performed by: STUDENT IN AN ORGANIZED HEALTH CARE EDUCATION/TRAINING PROGRAM

## 2025-07-06 PROCEDURE — 85025 COMPLETE CBC W/AUTO DIFF WBC: CPT | Performed by: STUDENT IN AN ORGANIZED HEALTH CARE EDUCATION/TRAINING PROGRAM

## 2025-07-06 PROCEDURE — 96361 HYDRATE IV INFUSION ADD-ON: CPT

## 2025-07-06 PROCEDURE — 99285 EMERGENCY DEPT VISIT HI MDM: CPT | Performed by: STUDENT IN AN ORGANIZED HEALTH CARE EDUCATION/TRAINING PROGRAM

## 2025-07-06 PROCEDURE — 99284 EMERGENCY DEPT VISIT MOD MDM: CPT | Performed by: SURGERY

## 2025-07-06 PROCEDURE — 36415 COLL VENOUS BLD VENIPUNCTURE: CPT

## 2025-07-06 PROCEDURE — 25010000002 PROCHLORPERAZINE 10 MG/2ML SOLUTION: Performed by: NURSE ANESTHETIST, CERTIFIED REGISTERED

## 2025-07-06 PROCEDURE — 25010000002 FENTANYL CITRATE (PF) 50 MCG/ML SOLUTION PREFILLED SYRINGE: Performed by: NURSE ANESTHETIST, CERTIFIED REGISTERED

## 2025-07-06 PROCEDURE — 71275 CT ANGIOGRAPHY CHEST: CPT

## 2025-07-06 PROCEDURE — 80053 COMPREHEN METABOLIC PANEL: CPT | Performed by: STUDENT IN AN ORGANIZED HEALTH CARE EDUCATION/TRAINING PROGRAM

## 2025-07-06 PROCEDURE — 25010000002 HYDROMORPHONE PER 4 MG: Performed by: NURSE ANESTHETIST, CERTIFIED REGISTERED

## 2025-07-06 PROCEDURE — 93005 ELECTROCARDIOGRAM TRACING: CPT | Performed by: STUDENT IN AN ORGANIZED HEALTH CARE EDUCATION/TRAINING PROGRAM

## 2025-07-06 PROCEDURE — 74300 X-RAY BILE DUCTS/PANCREAS: CPT

## 2025-07-06 PROCEDURE — 25810000003 LACTATED RINGERS SOLUTION: Performed by: STUDENT IN AN ORGANIZED HEALTH CARE EDUCATION/TRAINING PROGRAM

## 2025-07-06 PROCEDURE — 25510000001 IOPAMIDOL 61 % SOLUTION: Performed by: STUDENT IN AN ORGANIZED HEALTH CARE EDUCATION/TRAINING PROGRAM

## 2025-07-06 RX ORDER — FENTANYL CITRATE 50 UG/ML
INJECTION, SOLUTION INTRAMUSCULAR; INTRAVENOUS AS NEEDED
Status: DISCONTINUED | OUTPATIENT
Start: 2025-07-06 | End: 2025-07-06 | Stop reason: SURG

## 2025-07-06 RX ORDER — MAGNESIUM HYDROXIDE 1200 MG/15ML
LIQUID ORAL AS NEEDED
Status: DISCONTINUED | OUTPATIENT
Start: 2025-07-06 | End: 2025-07-06 | Stop reason: HOSPADM

## 2025-07-06 RX ORDER — MEPERIDINE HYDROCHLORIDE 25 MG/ML
12.5 INJECTION INTRAMUSCULAR; INTRAVENOUS; SUBCUTANEOUS
Status: DISCONTINUED | OUTPATIENT
Start: 2025-07-06 | End: 2025-07-06 | Stop reason: HOSPADM

## 2025-07-06 RX ORDER — LIDOCAINE HCL/PF 100 MG/5ML
SYRINGE (ML) INJECTION AS NEEDED
Status: DISCONTINUED | OUTPATIENT
Start: 2025-07-06 | End: 2025-07-06 | Stop reason: SURG

## 2025-07-06 RX ORDER — ONDANSETRON 4 MG/1
4 TABLET, FILM COATED ORAL EVERY 8 HOURS PRN
Qty: 8 TABLET | Refills: 0 | Status: SHIPPED | OUTPATIENT
Start: 2025-07-06 | End: 2025-07-11

## 2025-07-06 RX ORDER — LORAZEPAM 2 MG/ML
1 INJECTION INTRAMUSCULAR
Status: DISCONTINUED | OUTPATIENT
Start: 2025-07-06 | End: 2025-07-06 | Stop reason: HOSPADM

## 2025-07-06 RX ORDER — ONDANSETRON 2 MG/ML
INJECTION INTRAMUSCULAR; INTRAVENOUS AS NEEDED
Status: DISCONTINUED | OUTPATIENT
Start: 2025-07-06 | End: 2025-07-06 | Stop reason: SURG

## 2025-07-06 RX ORDER — HYDROMORPHONE HYDROCHLORIDE 2 MG/ML
INJECTION, SOLUTION INTRAMUSCULAR; INTRAVENOUS; SUBCUTANEOUS AS NEEDED
Status: DISCONTINUED | OUTPATIENT
Start: 2025-07-06 | End: 2025-07-06 | Stop reason: SURG

## 2025-07-06 RX ORDER — ROCURONIUM BROMIDE 50 MG/5 ML
SYRINGE (ML) INTRAVENOUS AS NEEDED
Status: DISCONTINUED | OUTPATIENT
Start: 2025-07-06 | End: 2025-07-06 | Stop reason: SURG

## 2025-07-06 RX ORDER — BUPIVACAINE HYDROCHLORIDE AND EPINEPHRINE 2.5; 5 MG/ML; UG/ML
INJECTION, SOLUTION EPIDURAL; INFILTRATION; INTRACAUDAL; PERINEURAL AS NEEDED
Status: DISCONTINUED | OUTPATIENT
Start: 2025-07-06 | End: 2025-07-06 | Stop reason: HOSPADM

## 2025-07-06 RX ORDER — IOPAMIDOL 612 MG/ML
100 INJECTION, SOLUTION INTRAVASCULAR
Status: COMPLETED | OUTPATIENT
Start: 2025-07-06 | End: 2025-07-06

## 2025-07-06 RX ORDER — IBUPROFEN 800 MG/1
800 TABLET, FILM COATED ORAL EVERY 6 HOURS PRN
Qty: 16 TABLET | Refills: 0 | Status: SHIPPED | OUTPATIENT
Start: 2025-07-06 | End: 2025-07-11

## 2025-07-06 RX ORDER — SUCCINYLCHOLINE/SOD CL,ISO/PF 200MG/10ML
SYRINGE (ML) INTRAVENOUS AS NEEDED
Status: DISCONTINUED | OUTPATIENT
Start: 2025-07-06 | End: 2025-07-06 | Stop reason: SURG

## 2025-07-06 RX ORDER — DEXAMETHASONE SODIUM PHOSPHATE 4 MG/ML
INJECTION, SOLUTION INTRA-ARTICULAR; INTRALESIONAL; INTRAMUSCULAR; INTRAVENOUS; SOFT TISSUE AS NEEDED
Status: DISCONTINUED | OUTPATIENT
Start: 2025-07-06 | End: 2025-07-06 | Stop reason: SURG

## 2025-07-06 RX ORDER — PROMETHAZINE HYDROCHLORIDE 12.5 MG/1
12.5 TABLET ORAL ONCE AS NEEDED
Status: DISCONTINUED | OUTPATIENT
Start: 2025-07-06 | End: 2025-07-06 | Stop reason: HOSPADM

## 2025-07-06 RX ORDER — SODIUM CHLORIDE 0.9 % (FLUSH) 0.9 %
10 SYRINGE (ML) INJECTION AS NEEDED
Status: DISCONTINUED | OUTPATIENT
Start: 2025-07-06 | End: 2025-07-06 | Stop reason: HOSPADM

## 2025-07-06 RX ORDER — PROCHLORPERAZINE EDISYLATE 5 MG/ML
10 INJECTION INTRAMUSCULAR; INTRAVENOUS ONCE AS NEEDED
Status: COMPLETED | OUTPATIENT
Start: 2025-07-06 | End: 2025-07-06

## 2025-07-06 RX ORDER — KETOROLAC TROMETHAMINE 30 MG/ML
15 INJECTION, SOLUTION INTRAMUSCULAR; INTRAVENOUS EVERY 6 HOURS PRN
Status: DISCONTINUED | OUTPATIENT
Start: 2025-07-06 | End: 2025-07-06 | Stop reason: HOSPADM

## 2025-07-06 RX ORDER — HYDROCODONE BITARTRATE AND ACETAMINOPHEN 5; 325 MG/1; MG/1
1-2 TABLET ORAL EVERY 4 HOURS PRN
Qty: 10 TABLET | Refills: 0 | Status: SHIPPED | OUTPATIENT
Start: 2025-07-06 | End: 2025-07-08

## 2025-07-06 RX ORDER — PROPOFOL 10 MG/ML
INJECTION, EMULSION INTRAVENOUS AS NEEDED
Status: DISCONTINUED | OUTPATIENT
Start: 2025-07-06 | End: 2025-07-06 | Stop reason: SURG

## 2025-07-06 RX ORDER — ALUMINA, MAGNESIA, AND SIMETHICONE 2400; 2400; 240 MG/30ML; MG/30ML; MG/30ML
15 SUSPENSION ORAL ONCE
Status: COMPLETED | OUTPATIENT
Start: 2025-07-06 | End: 2025-07-06

## 2025-07-06 RX ORDER — ONDANSETRON 2 MG/ML
4 INJECTION INTRAMUSCULAR; INTRAVENOUS ONCE
Status: COMPLETED | OUTPATIENT
Start: 2025-07-06 | End: 2025-07-06

## 2025-07-06 RX ADMIN — SODIUM CHLORIDE, POTASSIUM CHLORIDE, SODIUM LACTATE AND CALCIUM CHLORIDE 1000 ML: 600; 310; 30; 20 INJECTION, SOLUTION INTRAVENOUS at 01:27

## 2025-07-06 RX ADMIN — ONDANSETRON 4 MG: 2 INJECTION, SOLUTION INTRAMUSCULAR; INTRAVENOUS at 09:01

## 2025-07-06 RX ADMIN — FENTANYL CITRATE 50 MCG: 50 INJECTION, SOLUTION INTRAMUSCULAR; INTRAVENOUS at 09:20

## 2025-07-06 RX ADMIN — PROCHLORPERAZINE EDISYLATE 10 MG: 5 INJECTION INTRAMUSCULAR; INTRAVENOUS at 11:42

## 2025-07-06 RX ADMIN — HYDROMORPHONE HYDROCHLORIDE 0.5 MG: 2 INJECTION, SOLUTION INTRAMUSCULAR; INTRAVENOUS; SUBCUTANEOUS at 10:57

## 2025-07-06 RX ADMIN — HYDROMORPHONE HYDROCHLORIDE 0.5 MG: 2 INJECTION, SOLUTION INTRAMUSCULAR; INTRAVENOUS; SUBCUTANEOUS at 10:31

## 2025-07-06 RX ADMIN — FENTANYL CITRATE 50 MCG: 50 INJECTION, SOLUTION INTRAMUSCULAR; INTRAVENOUS at 09:01

## 2025-07-06 RX ADMIN — IOPAMIDOL 100 ML: 612 INJECTION, SOLUTION INTRAVENOUS at 01:22

## 2025-07-06 RX ADMIN — PROPOFOL 200 MG: 10 INJECTION, EMULSION INTRAVENOUS at 09:01

## 2025-07-06 RX ADMIN — DEXAMETHASONE SODIUM PHOSPHATE 4 MG: 4 INJECTION, SOLUTION INTRA-ARTICULAR; INTRALESIONAL; INTRAMUSCULAR; INTRAVENOUS; SOFT TISSUE at 09:01

## 2025-07-06 RX ADMIN — Medication 5 MG: at 09:01

## 2025-07-06 RX ADMIN — Medication 200 MG: at 09:01

## 2025-07-06 RX ADMIN — KETOROLAC TROMETHAMINE 15 MG: 30 INJECTION, SOLUTION INTRAMUSCULAR at 03:40

## 2025-07-06 RX ADMIN — Medication 50 MG: at 09:01

## 2025-07-06 RX ADMIN — ALUMINUM HYDROXIDE, MAGNESIUM HYDROXIDE, AND DIMETHICONE 15 ML: 400; 400; 40 SUSPENSION ORAL at 01:25

## 2025-07-06 RX ADMIN — Medication 45 MG: at 09:15

## 2025-07-06 RX ADMIN — HYDROMORPHONE HYDROCHLORIDE 0.5 MG: 2 INJECTION, SOLUTION INTRAMUSCULAR; INTRAVENOUS; SUBCUTANEOUS at 10:40

## 2025-07-06 RX ADMIN — SUGAMMADEX 200 MG: 100 INJECTION, SOLUTION INTRAVENOUS at 11:00

## 2025-07-06 RX ADMIN — SODIUM CHLORIDE 2 G: 9 INJECTION, SOLUTION INTRAVENOUS at 09:08

## 2025-07-06 RX ADMIN — HYDROMORPHONE HYDROCHLORIDE 0.5 MG: 2 INJECTION, SOLUTION INTRAMUSCULAR; INTRAVENOUS; SUBCUTANEOUS at 09:47

## 2025-07-06 RX ADMIN — ONDANSETRON 4 MG: 2 INJECTION INTRAMUSCULAR; INTRAVENOUS at 01:25

## 2025-07-06 NOTE — ANESTHESIA PROCEDURE NOTES
Airway  Reason: elective    Date/Time: 7/6/2025 9:03 AM  Airway not difficult    General Information and Staff    Patient location during procedure: OR  CRNA/CAA: Parish Gilbert CRNA    Indications and Patient Condition  Indications for airway management: airway protection    Preoxygenated: yes    Mask difficulty assessment: 1 - vent by mask    Final Airway Details    Final airway type: endotracheal airway      Successful airway: ETT  Cuffed: yes   Successful intubation technique: direct laryngoscopy  Adjuncts used in placement: intubating stylet  Endotracheal tube insertion site: oral  Blade: [place]  Blade size: 2  ETT size (mm): 7.0  Cormack-Lehane Classification: grade IIb - view of arytenoids or posterior of glottis only  Placement verified by: chest auscultation and capnometry   Cuff volume (mL): 7  Measured from: lips  ETT/EBT  to lips (cm): 22  Number of attempts at approach: 1  Assessment: lips, teeth, and gum same as pre-op and atraumatic intubation    Additional Comments  Airway placed without problems. Dentition and Lips as pre-induction. ETT cuff inflated to minimal occlusive pressure.

## 2025-07-06 NOTE — ANESTHESIA POSTPROCEDURE EVALUATION
Patient: Jatinder Millan    Procedure Summary       Date: 07/06/25 Room / Location: [facility] OR  /  [facility]    Anesthesia Start: 0903 Anesthesia Stop: 1108    Procedure: CHOLECYSTECTOMY LAPAROSCOPIC WITH INTRAOPERATIVE CHOLANGIOGRAM (Abdomen) Diagnosis:     Surgeons: Jayden Jay MD Provider: Parish Gilbert CRNA    Anesthesia Type: general ASA Status: 2 - Emergent            Anesthesia Type: general    Vitals  No vitals data found for the desired time range.          Post Anesthesia Care and Evaluation    Patient location during evaluation: bedside  Patient participation: complete - patient participated  Level of consciousness: awake  Pain score: 0  Pain management: adequate    Airway patency: patent  Anesthetic complications: No anesthetic complications  PONV Status: controlled  Cardiovascular status: acceptable and stable  Respiratory status: acceptable and room air  Hydration status: acceptable    Comments: Vital signs as noted in nursing documentation as per protocol

## 2025-07-06 NOTE — ANESTHESIA PREPROCEDURE EVALUATION
Anesthesia Evaluation     Patient summary reviewed and Nursing notes reviewed   NPO Solid Status: > 8 hours  NPO Liquid Status: > 8 hours           Airway   Mallampati: II  TM distance: >3 FB  Neck ROM: full  Possible difficult intubation  Dental    (+) edentulous    Pulmonary - normal exam   (+) a smoker vape,  Cardiovascular - normal exam        Neuro/Psych  (+) headaches  GI/Hepatic/Renal/Endo    (+) GERD well controlled    Musculoskeletal     Abdominal  - normal exam   Substance History      OB/GYN          Other                    Anesthesia Plan    ASA 2 - emergent     general     intravenous induction     Anesthetic plan, risks, benefits, and alternatives have been provided, discussed and informed consent has been obtained with: patient.  Pre-procedure education provided  Plan discussed with CRNA.    CODE STATUS:

## 2025-07-06 NOTE — H&P
[facility]   HISTORY AND PHYSICAL      Name:  Jatinder Millan   Age:  50 y.o.  Sex:  male  :  1975  MRN:  [id number]   Visit Number:  [id number]  Admission Date:  2025  Date Of Service:  25  Primary Care Physician:  Provider, No Known    Chief Complaint:     I have pain    History Of Presenting Illness:      Patient with a 2 to 3-day history of right upper quadrant pain with associated nausea and vomiting.  Patient states the pain is at least of moderate severity.  He has not had the symptoms in the past.    Review Of Systems:     General ROS: Patient denies any fevers, chills or loss of consciousness.  No complaints of generalized weakness  Psychological ROS: Denies any hallucinations and delusions.  Ophthalmic ROS: no transient loss of vision.  ENT ROS: Denies sore throat, nasal congestion or ear pain.   Allergy and Immunology ROS: Denies rash or itching.  Hematological and Lymphatic ROS: Denies neck swelling or easy bleeding.  Respiratory ROS: Denies cough or shortness of breath.   Cardiovascular ROS: Negative  Gastrointestinal ROS: As per HPI  Genito-Urinary ROS: Denies dysuria or hematuria.  Musculoskeletal ROS: no back pain. No muscle pain. No calf pain.   Neurological ROS: Denies any focal weakness. No loss of consciousness. Denies any numbness.   Dermatological ROS: Denies any redness or pruritis.     Past Medical History:    Past Medical History:   Diagnosis Date    Allergic rhinitis     GERD (gastroesophageal reflux disease)     Head trauma     Headache        Past Surgical history:    Past Surgical History:   Procedure Laterality Date    ANKLE SURGERY      HAND SURGERY      STOMACH SURGERY      for ulcers       Social History:    Social History     Socioeconomic History    Marital status:    Tobacco Use    Smoking status: Every Day     Current packs/day: 0.50     Types: Electronic Cigarette, Cigarettes    Smokeless tobacco: Never   Substance  and Sexual Activity    Alcohol use: Yes     Alcohol/week: 3.0 standard drinks of alcohol     Types: 3 Cans of beer per week     Comment: daily    Drug use: Not Currently    Sexual activity: Defer       Family History:    Family History   Problem Relation Age of Onset    Hypertension Mother     Diabetes Mother     Hyperlipidemia Mother     Hypertension Father     Diabetes Father     Hyperlipidemia Father        Allergies:      Penicillins    Home Medications:    Prior to Admission Medications       Prescriptions Last Dose Informant Patient Reported? Taking?    albuterol sulfate HFA (Ventolin HFA) 108 (90 Base) MCG/ACT inhaler   No No    Inhale 2 puffs Every 4 (Four) Hours As Needed for Wheezing.    azithromycin (ZITHROMAX Z-HARMEET) 250 MG tablet   No No    Take 2 tablets the first day, then 1 tablet daily for 4 days.    benzonatate (TESSALON) 100 MG capsule   No No    Take 1 capsule by mouth 3 (Three) Times a Day As Needed for Cough.    cyclobenzaprine (FLEXERIL) 5 MG tablet   No No    Take 1 tablet by mouth 3 (Three) Times a Day As Needed for Muscle Spasms.    doxycycline (MONODOX) 100 MG capsule Not Taking  No No    Take 1 capsule by mouth 2 (Two) Times a Day.    Patient not taking:  Reported on 7/6/2025    FLUoxetine (PROzac) 10 MG capsule   Yes No    Take 1 capsule by mouth Daily.    fluticasone (FLONASE) 50 MCG/ACT nasal spray   No No    Administer 2 sprays into the nostril(s) as directed by provider Daily for 14 days.    gabapentin (NEURONTIN) 300 MG capsule   Yes No    Take 1 capsule by mouth 2 (Two) Times a Day.    meloxicam (MOBIC) 7.5 MG tablet   No No    Take 1 tablet by mouth Daily.    traMADol (ULTRAM) 50 MG tablet   No No    Take 1 tablet by mouth Every 8 (Eight) Hours As Needed for Moderate Pain .               ED Medications:    Medications   sodium chloride 0.9 % flush 10 mL (has no administration in time range)   ketorolac (TORADOL) injection 15 mg (15 mg Intravenous Given 7/6/25 0340)   ondansetron  (ZOFRAN) injection 4 mg (4 mg Intravenous Given 7/6/25 0125)   aluminum-magnesium hydroxide-simethicone (MAALOX MAX) 400-400-40 MG/5ML suspension 15 mL (15 mL Oral Given 7/6/25 0125)   lactated ringers bolus 1,000 mL (0 mL Intravenous Stopped 7/6/25 0227)   iopamidol (ISOVUE-300) 61 % injection 100 mL (100 mL Intravenous Given 7/6/25 0122)       Vital Signs:    Temp:  [98.4 °F (36.9 °C)] 98.4 °F (36.9 °C)  Heart Rate:  [56-67] 67  Resp:  [19] 19  BP: (119-145)/(74-97) 134/75        07/06/25  0023   Weight: 90.7 kg (200 lb)       Body mass index is 32.28 kg/m².    Physical Exam:      General Appearance:  Alert and cooperative, not in any acute distress.   Head:  Atraumatic and normocephalic, without obvious abnormality.   Eyes:          PERRLA, conjunctivae and sclerae normal, no Icterus. No pallor. Extra-occular movements are within normal limits.   Ears:  Ears appear intact with no abnormalities noted.   Throat: No oral lesions, no thrush, oral mucosa moist.   Respiratory/Lungs:   Breath sounds heard bilaterally equally.  No crackles or wheezing. No Pleural rub or bronchial breathing. Normal respiratory effort.    Cardiovascular/Heart:  Normal S1 and S2, no murmur. No edema   GI/Abdomen:   Soft and nondistended with tenderness in the right upper quadrant             Musculoskeletal/ Extremities:   Moves all extremities well   Skin: No bleeding, bruising or rash, no induration   Psychiatric : Alert and oriented ×3.  No depression or anxiety    Neurologic: Cranial nerves 2 - 12 grossly intact, sensation intact, Motor power is normal and equal bilaterally.       EKG:          Labs:    Lab Results (last 24 hours)       Procedure Component Value Units Date/Time    High Sensitivity Troponin T 1Hr [[id number]] Collected: 07/06/25 0130    Specimen: Blood Updated: 07/06/25 0152     HS Troponin T <6 ng/L      Troponin T Numeric Delta --     Comment: Unable to calculate.       Narrative:      High Sensitive Troponin T  Reference Range:  <14.0 ng/L- Negative Female for AMI  <22.0 ng/L- Negative Male for AMI  >=14 - Abnormal Female indicating possible myocardial injury.  >=22 - Abnormal Male indicating possible myocardial injury.   Clinicians would have to utilize clinical acumen, EKG, Troponin, and serial changes to determine if it is an Acute Myocardial Infarction or myocardial injury due to an underlying chronic condition.         Lipase [[id number]]  (Normal) Collected: 07/06/25 0028    Specimen: Blood Updated: 07/06/25 0058     Lipase 50 U/L     High Sensitivity Troponin T [[id number]]  (Normal) Collected: 07/06/25 0028    Specimen: Blood Updated: 07/06/25 0052     HS Troponin T <6 ng/L     Narrative:      High Sensitive Troponin T Reference Range:  <14.0 ng/L- Negative Female for AMI  <22.0 ng/L- Negative Male for AMI  >=14 - Abnormal Female indicating possible myocardial injury.  >=22 - Abnormal Male indicating possible myocardial injury.   Clinicians would have to utilize clinical acumen, EKG, Troponin, and serial changes to determine if it is an Acute Myocardial Infarction or myocardial injury due to an underlying chronic condition.         Comprehensive Metabolic Panel [[id number]]  (Abnormal) Collected: 07/06/25 0028    Specimen: Blood Updated: 07/06/25 0050     Glucose 115 mg/dL      BUN 17.0 mg/dL      Creatinine 0.87 mg/dL      Sodium 135 mmol/L      Potassium 4.3 mmol/L      Chloride 101 mmol/L      CO2 22.1 mmol/L      Calcium 9.0 mg/dL      Total Protein 8.2 g/dL      Albumin 4.3 g/dL      ALT (SGPT) 40 U/L      AST (SGOT) 34 U/L      Alkaline Phosphatase 103 U/L      Total Bilirubin 0.2 mg/dL      Globulin 3.9 gm/dL      A/G Ratio 1.1 g/dL      BUN/Creatinine Ratio 19.5     Anion Gap 11.9 mmol/L      eGFR 105.8 mL/min/1.73     Narrative:      GFR Categories in Chronic Kidney Disease (CKD)              GFR Category          GFR (mL/min/1.73)    Interpretation  G1                    90 or greater        Normal or high  (1)  G2                    60-89                Mild decrease (1)  G3a                   45-59                Mild to moderate decrease  G3b                   30-44                Moderate to severe decrease  G4                    15-29                Severe decrease  G5                    14 or less           Kidney failure    (1)In the absence of evidence of kidney disease, neither GFR category G1 or G2 fulfill the criteria for CKD.    eGFR calculation 2021 CKD-EPI creatinine equation, which does not include race as a factor    [place] Draw [[id number]] Collected: 07/06/25 0028    Specimen: Blood Updated: 07/06/25 0045    Narrative:      The following orders were created for panel order [place] Draw.  Procedure                               Abnormality         Status                     ---------                               -----------         ------                     Green Top (Gel)[[id number]]                                  Final result               Lavender Top[[id number]]                                     Final result               Gold Top - SST[[id number]]                                   Final result               Light Blue Top[[id number]]                                   Final result                 Please view results for these tests on the individual orders.    Green Top (Gel) [[id number]] Collected: 07/06/25 0028    Specimen: Blood Updated: 07/06/25 0045     Extra Tube Hold for add-ons.     Comment: Auto resulted.       Lavender Top [[id number]] Collected: 07/06/25 0028    Specimen: Blood Updated: 07/06/25 0045     Extra Tube hold for add-on     Comment: Auto resulted       Gold Top - SST [[id number]] Collected: 07/06/25 0028    Specimen: Blood Updated: 07/06/25 0045     Extra Tube Hold for add-ons.     Comment: Auto resulted.       Light Blue Top [[id number]] Collected: 07/06/25 0028    Specimen: Blood Updated: 07/06/25 0045     Extra Tube Hold for add-ons.     Comment: Auto resulted       CBC &  Differential [[id number]]  (Normal) Collected: 07/06/25 0028    Specimen: Blood Updated: 07/06/25 0033    Narrative:      The following orders were created for panel order CBC & Differential.  Procedure                               Abnormality         Status                     ---------                               -----------         ------                     CBC Auto Differential[[id number]]        Normal              Final result                 Please view results for these tests on the individual orders.    CBC Auto Differential [[id number]]  (Normal) Collected: 07/06/25 0028    Specimen: Blood Updated: 07/06/25 0033     WBC 10.32 10*3/mm3      RBC 4.85 10*6/mm3      Hemoglobin 13.9 g/dL      Hematocrit 41.3 %      MCV 85.2 fL      MCH 28.7 pg      MCHC 33.7 g/dL      RDW 13.6 %      RDW-SD 42.3 fl      MPV 9.6 fL      Platelets 304 10*3/mm3      Neutrophil % 57.5 %      Lymphocyte % 29.6 %      Monocyte % 8.7 %      Eosinophil % 3.4 %      Basophil % 0.6 %      Immature Grans % 0.2 %      Neutrophils, Absolute 5.94 10*3/mm3      Lymphocytes, Absolute 3.05 10*3/mm3      Monocytes, Absolute 0.90 10*3/mm3      Eosinophils, Absolute 0.35 10*3/mm3      Basophils, Absolute 0.06 10*3/mm3      Immature Grans, Absolute 0.02 10*3/mm3      nRBC 0.0 /100 WBC             Radiology:    Imaging Results (Last 72 Hours)       Procedure Component Value Units Date/Time    CT Angiogram Chest Pulmonary Embolism [[id number]] Collected: 07/06/25 0245     Updated: 07/06/25 0247    Narrative:      FINAL REPORT    TECHNIQUE:  null    CLINICAL HISTORY:  Pulmonary Embolism; epigastric/ lower chest pain and RT flank  pain, nausea, vomiting    COMPARISON:  null    FINDINGS:  CT angiography chest with contrast. 3D Postprocessing.    Comparison: None provided.    Findings:    No pulmonary embolism.    No thoracic aortic aneurysm.    Moderate cardiomegaly..    No focal infiltrates or masses.    No edema.    Several subcentimeter noncalcified  nodules bilaterally, largest 7 mm right upper lobe. There are also several benign calcified nodules.    No pneumothorax    No significant pleural effusion.    Nonspecific mild right hilar adenpathy.    No acute chest wall pathology.    No acute fracture or dislocation.    Degenerative changes.      Impression:      IMPRESSION:    No pulmonary emboli.    No infiltrates or edema.    Moderate cardiomegaly.    Right hilar adenopathy.    Indeterminate lung nodules.    ______________________________    Fleischner Society 2017 Guidelines for incidentally detected indeterminate nodules in persons 35 years of age or older.    Multiple Solid Nodules:    5 mm or smaller nodules need no follow-up in low risk, and 12 month CT follow-up is optional in high risk patients.    6-8 mm nodules need 3-6 month CT follow-up followed by an optional 18-24 month CT follow-up regardless of patient risk.    9 mm or larger nodules should consider 3-6 month CT follow-up. For low risk 18-24 month follow-up is optional, whereas for high risk it is needed.    Authenticated and Electronically Signed by Sana Jaffe MD  on 07/06/2025 02:45:17 AM    CT Abdomen Pelvis With Contrast [[id number]] Collected: 07/06/25 0244     Updated: 07/06/25 0246    Narrative:      FINAL REPORT    TECHNIQUE:  null    CLINICAL HISTORY:  upper epigastric abdominal pain/ RT flank pain, nausea, vomiting    COMPARISON:  null    FINDINGS:  CT abdomen and pelvis with IV contrast.    Comparison: CT/OT/SR - CT ABDOMEN PELVIS WO CONTRAST - 1/24/16 22:09 EST    Findings:    No free air.    No solid liver mass. Fatty infiltration.    Moderate gallbladder wall thickening. No calcified gallstones.    No clinically significant biliary ductal dilation.    No splenomegaly or suspicious splenic lesions.    No solid or cystic pancreatic mass. No pancreatic ductal dilation. No acute inflammatory changes.    Adrenal glands without nodule.    No suspicious renal mass. No hydronephrosis.  No significant stranding.    Bladder without acute pathology.    Dense colonic fecal retention without obstruction.    No evidence for acute appendicitis.    No adenopathy.    No abdominal aortic aneurysm.    No suspicious pelvic masses.    Small fat containing umbilical hernia.    No acute osseous pathology.      Impression:      IMPRESSION:    Moderate gallbladder wall thickening. No calcified gallstones. Consider RUQ US correlation.    Normal pancreas.    No appendicitis or diverticulitis changes.    Authenticated and Electronically Signed by Sana Jaffe MD  on 07/06/2025 02:44:49 AM        I reviewed the CT scan including films and I agree with the interpretation    Assessment:    Cholecystitis    Plan:     Patient has symptoms consistent with biliary colic/cholecystitis given the location of his pain and his tenderness and findings on CT scan.  I offered the patient both medical management versus a laparoscopic cholecystectomy.  The risks and benefits of each were clearly explained to the patient.  Specifically the risks of the laparoscopic procedure include but are not limited to bleeding, infection, bile leak, ductal injury, intestinal injury, the possibility of converting to an open procedure etc. The patient understands all of the above and I have answered all of their questions.  They wish to proceed with a laparoscopic cholecystectomy.    We discussed pain control postoperatively.  He states any narcotics would be given to his mother who will control dispensing of these medications although he has never had any substance abuse to pain medicine    Jayden Jay MD  07/06/25  08:46 EDT

## 2025-07-06 NOTE — ED PROVIDER NOTES
EMERGENCY DEPARTMENT ENCOUNTER    Pt Name: Jatinder Millan  MRN: [id number]  Pt :   1975  Room Number:  15/15  Date of encounter:  2025  PCP: Provider, No Known  ED Provider: Shilpa Barrientos MD          HPI:    Context: Jatinder Millan is a 50 y.o. male who presents to the ED with chief complaint of epigastric abdominal pain and chest pain.  Patient states has been going on for a few days however this evening it got significantly worse, he states that he broke out in a cold sweat and chills.  He states that he is unable to describe the pain, it is worse with taking a deep breath.  He said that he has been vomiting up everything that he has attempted to take down over the past 2 days.  He denies any hematemesis.  He states that he has been able to pass gas and has had a nonbloody bowel movement.  He does report there is a history of gastric ulcers that did require surgical treatment.  Not taken anything to treat his symptoms at home.  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    Historian: Patient  PAST MEDICAL HISTORY  Past Medical History:   Diagnosis Date    Allergic rhinitis     GERD (gastroesophageal reflux disease)     Head trauma     Headache          PAST SURGICAL HISTORY  Past Surgical History:   Procedure Laterality Date    ANKLE SURGERY      HAND SURGERY      STOMACH SURGERY      for ulcers         FAMILY HISTORY  Family History   Problem Relation Age of Onset    Hypertension Mother     Diabetes Mother     Hyperlipidemia Mother     Hypertension Father     Diabetes Father     Hyperlipidemia Father          SOCIAL HISTORY  Social History     Socioeconomic History    Marital status:    Tobacco Use    Smoking status: Every Day     Current packs/day: 0.50     Types: Electronic Cigarette, Cigarettes    Smokeless tobacco: Never   Substance and Sexual Activity    Alcohol use: Yes     Alcohol/week:  3.0 standard drinks of alcohol     Types: 3 Cans of beer per week     Comment: daily    Drug use: Not Currently    Sexual activity: Defer         ALLERGIES  Penicillins        REVIEW OF SYSTEMS    Below review of systems reviewed and negative except for those discussed in HPI.   Constitutional: Negative except as documented in HPI.  Respiratory: Negative except as documented in HPI.  Eyes: Negative except as documented in HPI.  CV: Negative except as documented in HPI.  Resp: Negative except as documented in HPI.  GI: Negative except as documented in HPI.  : Negative except as documented in HPI.  MSK: Negative except as documented in HPI.  Skin: Negative except as documented in HPI.  Neuro: Negative except as documented in HPI.  Psych: Negative except as documented in HPI.      PHYSICAL EXAM  Physical Exam    General:  Awake, alert, no acute distress  HEENT: Atraumatic, normocephalic, EOMI, PERRLA, mucous membranes moist  Cardiovascular:  Regular rate, regular rhythm, no murmurs, rubs, or gallops.  Extremities well perfused   Respiratory:  No distress, clear lungs to auscultation bilaterally.  No rhonchi, rales, wheezing  Abdominal: Mild abdominal distention, discomfort in the epigastric region, right upper quadrant tenderness no CVA tenderness  extremity:  No visible bony abnormalities in all 4 extremities.  Full range of motion of all extremities.  Skin:  Warm and dry.  No rashes  Neuro:  AAOx3, GCS 15. Cranial nerves 2-12 grossly intact.  No focal strength or sensation deficits.  Psych:  Mood and affect appropriate.      I have reviewed the triage vital signs and nursing notes.    ED Triage Vitals [07/06/25 0023]   Temp Heart Rate Resp BP SpO2   98.4 °F (36.9 °C) 63 19 131/86 95 %      Temp src Heart Rate Source Patient Position BP Location FiO2 (%)   Oral Monitor Sitting Left arm --               MEDICAL DECISION MAKING, PROGRESS, and CONSULTS  Context: Jatinder Millan is a 50 y.o. male who presents to  the ED with chief complaint of abdominal pain and chest pain.    Differential diagnosis:    ACS, PE, pancreatitis, Boerhaave syndrome, pneumomediastinum, cholelithiasis, cholecystitis, small bowel obstruction, esophagitis, gastritis, among others    Orders placed during this visit for evaluation and treatment of patient:  Orders Placed This Encounter   Procedures    CT Abdomen Pelvis With Contrast    CT Angiogram Chest Pulmonary Embolism    [place] Draw    Comprehensive Metabolic Panel    High Sensitivity Troponin T    CBC Auto Differential    Lipase    High Sensitivity Troponin T 1Hr    NPO Diet NPO Type: Strict NPO    Undress & Gown    Continuous Pulse Oximetry    Oxygen Therapy- Nasal Cannula; Titrate 1-6 LPM Per SpO2; 90 - 95%    ECG 12 Lead ED Triage Standing Order; Chest Pain    ECG 12 Lead ED Triage Standing Order; Chest Pain    Insert Peripheral IV    CBC & Differential    Green Top (Gel)    Lavender Top    Gold Top - SST    Light Blue Top     Medications   sodium chloride 0.9 % flush 10 mL (has no administration in time range)   ketorolac (TORADOL) injection 15 mg (15 mg Intravenous Given 7/6/25 0340)   ondansetron (ZOFRAN) injection 4 mg (4 mg Intravenous Given 7/6/25 0125)   aluminum-magnesium hydroxide-simethicone (MAALOX MAX) 400-400-40 MG/5ML suspension 15 mL (15 mL Oral Given 7/6/25 0125)   lactated ringers bolus 1,000 mL (0 mL Intravenous Stopped 7/6/25 0227)   iopamidol (ISOVUE-300) 61 % injection 100 mL (100 mL Intravenous Given 7/6/25 0122)       ED COURSE & DISPOSITION  ED Course as of 07/06/25 0431   Sun Jul 06, 2025   0028 EKG with a sinus rhythm, no ST elevation or ST depression, no T wave inversion, normal QRS, normal QT []   0106 CBC and CMP with no acute concerning findings.  Initial troponin is less than 6.  Lipase is not elevated. [JH]      ED Course User Index  [] Shilpa Barrientos MD           LAB & RADIOLOGY RADIOLOGY  Recent Results (from the past 24 hours)   Comprehensive  Metabolic Panel    Collection Time: 07/06/25 12:28 AM    Specimen: Blood   Result Value Ref Range    Glucose 115 (H) 65 - 99 mg/dL    BUN 17.0 6.0 - 20.0 mg/dL    Creatinine 0.87 0.76 - 1.27 mg/dL    Sodium 135 (L) 136 - 145 mmol/L    Potassium 4.3 3.5 - 5.2 mmol/L    Chloride 101 98 - 107 mmol/L    CO2 22.1 22.0 - 29.0 mmol/L    Calcium 9.0 8.6 - 10.5 mg/dL    Total Protein 8.2 6.0 - 8.5 g/dL    Albumin 4.3 3.5 - 5.2 g/dL    ALT (SGPT) 40 1 - 41 U/L    AST (SGOT) 34 1 - 40 U/L    Alkaline Phosphatase 103 39 - 117 U/L    Total Bilirubin 0.2 0.0 - 1.2 mg/dL    Globulin 3.9 gm/dL    A/G Ratio 1.1 g/dL    BUN/Creatinine Ratio 19.5 7.0 - 25.0    Anion Gap 11.9 5.0 - 15.0 mmol/L    eGFR 105.8 >60.0 mL/min/1.73   High Sensitivity Troponin T    Collection Time: 07/06/25 12:28 AM    Specimen: Blood   Result Value Ref Range    HS Troponin T <6 <22 ng/L   Green Top (Gel)    Collection Time: 07/06/25 12:28 AM   Result Value Ref Range    Extra Tube Hold for add-ons.    Lavender Top    Collection Time: 07/06/25 12:28 AM   Result Value Ref Range    Extra Tube hold for add-on    Gold Top - SST    Collection Time: 07/06/25 12:28 AM   Result Value Ref Range    Extra Tube Hold for add-ons.    Light Blue Top    Collection Time: 07/06/25 12:28 AM   Result Value Ref Range    Extra Tube Hold for add-ons.    CBC Auto Differential    Collection Time: 07/06/25 12:28 AM    Specimen: Blood   Result Value Ref Range    WBC 10.32 3.40 - 10.80 10*3/mm3    RBC 4.85 4.14 - 5.80 10*6/mm3    Hemoglobin 13.9 13.0 - 17.7 g/dL    Hematocrit 41.3 37.5 - 51.0 %    MCV 85.2 79.0 - 97.0 fL    MCH 28.7 26.6 - 33.0 pg    MCHC 33.7 31.5 - 35.7 g/dL    RDW 13.6 12.3 - 15.4 %    RDW-SD 42.3 37.0 - 54.0 fl    MPV 9.6 6.0 - 12.0 fL    Platelets 304 140 - 450 10*3/mm3    Neutrophil % 57.5 42.7 - 76.0 %    Lymphocyte % 29.6 19.6 - 45.3 %    Monocyte % 8.7 5.0 - 12.0 %    Eosinophil % 3.4 0.3 - 6.2 %    Basophil % 0.6 0.0 - 1.5 %    Immature Grans % 0.2 0.0 - 0.5 %     Neutrophils, Absolute 5.94 1.70 - 7.00 10*3/mm3    Lymphocytes, Absolute 3.05 0.70 - 3.10 10*3/mm3    Monocytes, Absolute 0.90 0.10 - 0.90 10*3/mm3    Eosinophils, Absolute 0.35 0.00 - 0.40 10*3/mm3    Basophils, Absolute 0.06 0.00 - 0.20 10*3/mm3    Immature Grans, Absolute 0.02 0.00 - 0.05 10*3/mm3    nRBC 0.0 0.0 - 0.2 /100 WBC   Lipase    Collection Time: 07/06/25 12:28 AM    Specimen: Blood   Result Value Ref Range    Lipase 50 13 - 60 U/L   High Sensitivity Troponin T 1Hr    Collection Time: 07/06/25  1:30 AM    Specimen: Blood   Result Value Ref Range    HS Troponin T <6 <22 ng/L    Troponin T Numeric Delta         If labs were ordered, I independently reviewed  and interpreted the results and took them into assessment while treating the patient.     CT Angiogram Chest Pulmonary Embolism  Result Date: 7/6/2025  FINAL REPORT TECHNIQUE: null CLINICAL HISTORY: Pulmonary Embolism; epigastric/ lower chest pain and RT flank pain, nausea, vomiting COMPARISON: null FINDINGS: CT angiography chest with contrast. 3D Postprocessing. Comparison: None provided. Findings: No pulmonary embolism. No thoracic aortic aneurysm. Moderate cardiomegaly.. No focal infiltrates or masses. No edema. Several subcentimeter noncalcified nodules bilaterally, largest 7 mm right upper lobe. There are also several benign calcified nodules. No pneumothorax No significant pleural effusion. Nonspecific mild right hilar adenpathy. No acute chest wall pathology. No acute fracture or dislocation. Degenerative changes.     IMPRESSION: No pulmonary emboli. No infiltrates or edema. Moderate cardiomegaly. Right hilar adenopathy. Indeterminate lung nodules. ______________________________ Fleischner Society 2017 Guidelines for incidentally detected indeterminate nodules in persons 35 years of age or older. Multiple Solid Nodules: 5 mm or smaller nodules need no follow-up in low risk, and 12 month CT follow-up is optional in high risk patients. 6-8 mm  nodules need 3-6 month CT follow-up followed by an optional 18-24 month CT follow-up regardless of patient risk. 9 mm or larger nodules should consider 3-6 month CT follow-up. For low risk 18-24 month follow-up is optional, whereas for high risk it is needed. Authenticated and Electronically Signed by Sana Jaffe MD on 07/06/2025 02:45:17 AM    CT Abdomen Pelvis With Contrast  Result Date: 7/6/2025  FINAL REPORT TECHNIQUE: null CLINICAL HISTORY: upper epigastric abdominal pain/ RT flank pain, nausea, vomiting COMPARISON: null FINDINGS: CT abdomen and pelvis with IV contrast. Comparison: CT/OT/SR - CT ABDOMEN PELVIS WO CONTRAST - 1/24/16 22:09 EST Findings: No free air. No solid liver mass. Fatty infiltration. Moderate gallbladder wall thickening. No calcified gallstones. No clinically significant biliary ductal dilation. No splenomegaly or suspicious splenic lesions. No solid or cystic pancreatic mass. No pancreatic ductal dilation. No acute inflammatory changes. Adrenal glands without nodule. No suspicious renal mass. No hydronephrosis. No significant stranding. Bladder without acute pathology. Dense colonic fecal retention without obstruction. No evidence for acute appendicitis. No adenopathy. No abdominal aortic aneurysm. No suspicious pelvic masses. Small fat containing umbilical hernia. No acute osseous pathology.     IMPRESSION: Moderate gallbladder wall thickening. No calcified gallstones. Consider RUQ US correlation. Normal pancreas. No appendicitis or diverticulitis changes. Authenticated and Electronically Signed by Sana Jaffe MD on 07/06/2025 02:44:49 AM      I ordered and independently reviewed the above noted radiographic studies.      Lab and radiology results significant for no elevation in lipase, no transaminitis, patient does have thickening of his gallbladder wall    See radiologist's dictation for official interpretation.        I reevaluated patient, patient remains uncomfortable  appearing, has continued right upper quadrant pain on palpation.  In the setting of thickening of his gallbladder wall did discuss patient with general surgeon Dr. Jay who will see patient in the morning, advised for supervisor to be aware and for patient to be on schedule for 830.  Physical indicated that with the patient who is agreeable.  Patient has been NPO.  Patient to go to the OR in AM.    PROCEDURES    ECG 12 Lead ED Triage Standing Order; Chest Pain   Final Result          Procedures              Please note that portions of this document were completed with voice recognition software.        Shilpa Barrientos MD  07/06/25 043

## 2025-07-06 NOTE — OP NOTE
PATIENT:     Jatinder Millan    DATE OF SURGERY:     7/6/2025    PHYSICIAN:   Jayden Jay MD    REFERRING PHYSICIAN:  Provider, No Known     YOB: 1975    PREOPERATIVE DIAGNOSIS: Acute cholecystitis    POSTOPERATIVE DIAGNOSIS: Acute cholecystitis due to Cholelithiasis 2-extensive adhesions    PROCEDURE:  Laparoscopic cholecystectomy with intraoperative cholangiography.  2-adhesional lysis    ANESTHESIA:  General endotracheal.    EBL: Less than 30 mL    Specimen: Gallbladder    Complications: None    OPERATIVE PROCEDURE:  The patient was taken to the operating room, placed in the supine position, and given general endotracheal anesthesia.  The patient was prepped and draped in the normal sterile fashion, and also received preoperative IV antibiotics.  An appropriate timeout was performed by the nursing staff prior to the incision.     Local anesthetic with Marcaine 0.5% was infused locally at all incision sites. An infraumbilical incision was then made to insert a 5 mm Optiview into the peritoneal cavity and the peritoneal cavity was insufflated with carbon dioxide.  This was later exchanged for a 11 mm port.  2 right upper quadrant 5 mm ports as well as an epigastric 5 mm port were also placed.    Extensive adhesions were present from prior perforated peptic ulcer.  Tedious dissection was performed dividing adhesions in the right upper quadrant both bluntly and with cautery.  The proximal transverse colon was also adhesed to the region and was dissected free till it was able to be retracted inferiorly.  Ultimately the gallbladder was identified.  It was contracted and edematous with findings concerning for acute cholecystitis.  Ultimately the gallbladder was retracted cephalad and the infundibulum laterally.    Good exposure was obtained and dissection was performed at the junction of the cystic duct and the gallbladder. The cystic duct and cystic artery were identified in the normal  manner.  A clip was then placed on the cystic duct proximally and then two clips were placed on the cystic artery proximally and one distally.  Cystic ductotomy was performed.  A separate cholangiogram catheter had been inserted through a right upper quadrant introducer port and then this was fed into the cystic duct itself and a clip was applied.     Intraoperative cholangiography was then performed under fluoroscopy, carefully evaluating the biliary ductal anatomy.  This was done without difficulty.  No anatomic abnormality was noted.      The cholangiogram catheter was removed.  The cystic duct was clipped twice distally and then divided, as was the cystic artery.  Bovie electrocautery with L-hook was then used to remove the gallbladder from the liver bed.  A posterior cystic artery was also identified and clipped.  The gallbladder was then removed through the umbilical port site using an Endo Catch bag. Fascia was closed with a 2-O Vicryl figure of eight suture.    The operative area was irrigated.  There was some general ooze from the liver bed that was controlled with cautery but I used Tisseel for added hemostasis.  It was clean and dry at this point.  Hemostasis had been well controlled.  Hemostasis was intact and there was no evidence of bilious leakage. Trocars were removed under direct vision and the skin was closed with 4-0 PDS subcuticular stitch along with Steri-Strips for skin reapproximation.  There were no complications.    The patient was stable at this point and subsequently transferred back to the recovery room in stable condition.    Jayden Jay MD    7/6/2025    11:00 EDT

## 2025-07-08 LAB — REF LAB TEST METHOD: NORMAL

## 2025-07-09 ENCOUNTER — TELEPHONE (OUTPATIENT)
Dept: SURGERY | Facility: CLINIC | Age: 50
End: 2025-07-09

## 2025-07-09 NOTE — TELEPHONE ENCOUNTER
TRIED TO CALL JOVI ABOUT INSURANCE INFORMATION. CALLED PT  FATHER HE STATED THAT JOVI DID NOT HAVE TIME ON HIS PHONE. HE DID NOT KNOW IF PT HAD ANY INSURANCE.

## 2025-07-10 ENCOUNTER — OFFICE VISIT (OUTPATIENT)
Dept: SURGERY | Facility: CLINIC | Age: 50
End: 2025-07-10
Payer: MEDICAID

## 2025-07-10 VITALS
SYSTOLIC BLOOD PRESSURE: 110 MMHG | HEART RATE: 76 BPM | BODY MASS INDEX: 31.82 KG/M2 | TEMPERATURE: 97.6 F | WEIGHT: 198 LBS | RESPIRATION RATE: 12 BRPM | DIASTOLIC BLOOD PRESSURE: 72 MMHG | HEIGHT: 66 IN | OXYGEN SATURATION: 98 %

## 2025-07-10 DIAGNOSIS — Z48.89 POSTOPERATIVE VISIT: Primary | ICD-10-CM

## 2025-07-10 NOTE — PROGRESS NOTES
"Patient: Jatinder Millan    YOB: 1975    Date: 07/10/2025    Primary Care Provider: Provider, No Known    Chief Complaint   Patient presents with    Post-op     Lap Marcelle       History of present illness:  I saw the patient in the office today as a followup from their recent laparoscopic cholecystectomy.  He states that the pain that he had prior to surgery has resolved.  He is eating well.  Not having any issues.    Vital Signs:   Vitals:    07/10/25 1500   BP: 110/72   Pulse: 76   Resp: 12   Temp: 97.6 °F (36.4 °C)   TempSrc: Infrared   SpO2: 98%   Weight: 89.8 kg (198 lb)   Height: 167.6 cm (66\")       Physical Exam:    Abdomen: Soft nontender nondistended.  Wounds are healing nicely.     Assessment / Plan :    1. Postoperative visit      Patient doing well after cholecystectomy.  Dietary and activity instructions were given.  He does landscaping I explained that he should avoid heavy lifting of more than 15 or 20 pounds for the next 4 weeks.  He will follow-up with me as needed.    Electronically signed by Jayden Jay MD  07/10/25                "